# Patient Record
Sex: FEMALE | Race: WHITE
[De-identification: names, ages, dates, MRNs, and addresses within clinical notes are randomized per-mention and may not be internally consistent; named-entity substitution may affect disease eponyms.]

---

## 2017-02-02 ENCOUNTER — TRANSCRIPTION ENCOUNTER (OUTPATIENT)
Age: 57
End: 2017-02-02

## 2017-02-08 PROBLEM — Z00.00 ENCOUNTER FOR PREVENTIVE HEALTH EXAMINATION: Status: ACTIVE | Noted: 2017-02-08

## 2017-03-09 ENCOUNTER — APPOINTMENT (OUTPATIENT)
Dept: OTOLARYNGOLOGY | Facility: CLINIC | Age: 57
End: 2017-03-09

## 2017-04-06 ENCOUNTER — APPOINTMENT (OUTPATIENT)
Dept: OTOLARYNGOLOGY | Facility: CLINIC | Age: 57
End: 2017-04-06

## 2017-05-11 ENCOUNTER — APPOINTMENT (OUTPATIENT)
Dept: OTOLARYNGOLOGY | Facility: CLINIC | Age: 57
End: 2017-05-11

## 2017-08-04 ENCOUNTER — APPOINTMENT (OUTPATIENT)
Dept: OTOLARYNGOLOGY | Facility: AMBULATORY SURGERY CENTER | Age: 57
End: 2017-08-04

## 2018-03-12 ENCOUNTER — APPOINTMENT (OUTPATIENT)
Dept: SURGERY | Facility: CLINIC | Age: 58
End: 2018-03-12
Payer: COMMERCIAL

## 2018-03-12 VITALS
WEIGHT: 250 LBS | HEIGHT: 70 IN | SYSTOLIC BLOOD PRESSURE: 126 MMHG | TEMPERATURE: 98.4 F | DIASTOLIC BLOOD PRESSURE: 74 MMHG | HEART RATE: 103 BPM | BODY MASS INDEX: 35.79 KG/M2

## 2018-03-12 PROCEDURE — 99213 OFFICE O/P EST LOW 20 MIN: CPT

## 2018-09-10 ENCOUNTER — APPOINTMENT (OUTPATIENT)
Dept: SURGERY | Facility: CLINIC | Age: 58
End: 2018-09-10
Payer: COMMERCIAL

## 2018-09-10 VITALS
TEMPERATURE: 98 F | HEART RATE: 80 BPM | WEIGHT: 250 LBS | SYSTOLIC BLOOD PRESSURE: 130 MMHG | HEIGHT: 70 IN | BODY MASS INDEX: 35.79 KG/M2 | DIASTOLIC BLOOD PRESSURE: 80 MMHG

## 2018-09-10 PROCEDURE — 99213 OFFICE O/P EST LOW 20 MIN: CPT

## 2019-03-11 ENCOUNTER — APPOINTMENT (OUTPATIENT)
Dept: SURGERY | Facility: CLINIC | Age: 59
End: 2019-03-11

## 2019-03-25 ENCOUNTER — APPOINTMENT (OUTPATIENT)
Dept: SURGERY | Facility: CLINIC | Age: 59
End: 2019-03-25
Payer: COMMERCIAL

## 2019-03-25 VITALS
WEIGHT: 250 LBS | SYSTOLIC BLOOD PRESSURE: 136 MMHG | TEMPERATURE: 97.8 F | HEIGHT: 70 IN | DIASTOLIC BLOOD PRESSURE: 84 MMHG | HEART RATE: 98 BPM | BODY MASS INDEX: 35.79 KG/M2

## 2019-03-25 PROCEDURE — 99213 OFFICE O/P EST LOW 20 MIN: CPT

## 2019-03-26 NOTE — HISTORY OF PRESENT ILLNESS
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 She underwent a right thoracoabdominal incision. She had an incisional hernia that was repaired with mesh in July 2015. She comes today for followup visit with no abdominal symptoms. She developed a couple of meningiomas and she has numbness in her left hemiface. Last CT was in March 2019 with LAURA. She is otherwise asymptomatic.

## 2019-03-26 NOTE — PHYSICAL EXAM
[Normal] : supple, no neck mass and thyroid not enlarged [Normal] : oriented to person, place and time, with appropriate affect [de-identified] : well-healed scars, no hernias.

## 2019-03-26 NOTE — ASSESSMENT
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 She underwent a right thoracoabdominal incision. She had an incisional hernia that was repaired with mesh in July 2015. She comes today for followup visit with no abdominal symptoms. She developed a couple of meningiomas and she has numbness in her left hemiface. Last CT was in March 2019 with LAURA. She is otherwise asymptomatic.\par \par Assessment and Plan:\par \par s/p radical resection of right retroperineal liposarcoma in April 2014.\par No adjuvant therapy recommended.\par Currently LAURA. Last CT in March 2019.with no evidence of recurrence.\par \par Return as needed or she becomes symptomatic.\par \par All the questions were answered to their satisfaction and I encourage the patient to call my office at anytime if she had any questions. Plan of care fully discussed with the patient.\par

## 2020-06-11 ENCOUNTER — RESULT REVIEW (OUTPATIENT)
Age: 60
End: 2020-06-11

## 2020-12-01 ENCOUNTER — TRANSCRIPTION ENCOUNTER (OUTPATIENT)
Age: 60
End: 2020-12-01

## 2020-12-30 ENCOUNTER — TRANSCRIPTION ENCOUNTER (OUTPATIENT)
Age: 60
End: 2020-12-30

## 2021-06-02 ENCOUNTER — TRANSCRIPTION ENCOUNTER (OUTPATIENT)
Age: 61
End: 2021-06-02

## 2021-06-16 ENCOUNTER — APPOINTMENT (OUTPATIENT)
Dept: ORTHOPEDIC SURGERY | Facility: CLINIC | Age: 61
End: 2021-06-16
Payer: COMMERCIAL

## 2021-06-16 VITALS — TEMPERATURE: 97.8 F

## 2021-06-16 DIAGNOSIS — Z96.641 PRESENCE OF RIGHT ARTIFICIAL HIP JOINT: ICD-10-CM

## 2021-06-16 DIAGNOSIS — Z78.9 OTHER SPECIFIED HEALTH STATUS: ICD-10-CM

## 2021-06-16 PROCEDURE — 73522 X-RAY EXAM HIPS BI 3-4 VIEWS: CPT

## 2021-06-16 PROCEDURE — 99204 OFFICE O/P NEW MOD 45 MIN: CPT

## 2021-06-16 NOTE — PHYSICAL EXAM
[de-identified] : General appearance: well nourished and hydrated, pleasant, alert and oriented x 3, cooperative.\par Cardiovascular: no apparent abnormalities, no lower leg edema, no varicosities, pedal pulses are palpable.\par Neurologic: sensation is normal, no muscle weakness in upper or lower extremities\par Dermatologic no apparent skin lesions, moist, warm, no rash.\par Gait: nonantalgic.\par \par Left knee\par Inspection: no effusion or erythema.\par Wounds: none.\par Alignment: normal.\par Palpation: no specific tenderness on palpation.\par ROM: 0-120 degrees\par Ligamentous laxity: all ligaments appear stable\par Muscle Test: good quad strength.\par \par Right knee\par Inspection: no effusion or erythema.\par Wounds: none.\par Alignment: normal.\par Palpation: no specific tenderness on palpation.\par ROM: 0-120 degrees\par Ligamentous laxity: all ligaments appear stable\par Muscle Test: good quad strength.\par \par Left Hip\par Inspection: No effusion\par Wounds: Healed incision about the left hip, no drainage or erythema\par Alignment: Normal\par Stability: No instability\par Palpation: No specific tenderness on palpation\par ROM: Flexion to 80, ER 30, ABD 30 \par Muscle Test: 5/5 All motor groups\par Leg examination: Calf is soft and non-tender.\par \par Right Hip\par Inspection: No effusion\par Wounds: Healed incision about the left hip, no drainage or erythema\par Alignment: Normal\par Stability: No instability\par Palpation: No specific tenderness on palpation\par ROM:  Flexion to 80, ER 30, ABD 30 \par Muscle Test: 5/5 All motor groups\par Leg examination: Calf is soft and non-tender.\par \par \par  [de-identified] : Radiographs done today AP pelvis and lateral of both hips shows well aligned cementless total hip replacements , no evidence of loosening or wear. No fractures or dislocations.

## 2021-06-16 NOTE — ASSESSMENT
[FreeTextEntry1] : 60 year old female s/p bilateral total hips, right in 02/2013 and the left in 06/2013 presents to the office with complaints of low back pain. No groin pain. Ranging her hips recreates lower lumbar pain. Her radiographs of hips shows them to be aligned and well fixed. Pt has a Hx of low back pain and i believe that this is an exacerbation. We ordered radiographs of her lumbar spine to be done. After reviewing these we will most likely refer her to a physical therapist or rehab doctor for eval and Tx.

## 2021-06-16 NOTE — HISTORY OF PRESENT ILLNESS
[de-identified] : 60 year old female s/p left total hip replacement 6/4/13 and right total hip replacement 2/13/13 presents to the office today complaining of new onset bilateral hip pain x1 month. Patient reports pain that is achy and dull in nature. Patient denies any groin pain and states that the severe pain comes and goes. Patient reports pain laterally at the hip with some low back pain. Patient denies any swelling, buckling, locking, clicking, or loss of motion. He reports doing okay with negotiating stairs. Patient denies any issues with laying in bed and reports difficulty with prolonged activities. She is taking Advil with only some relief. Patient is here today to discuss for pain relief.

## 2021-07-01 ENCOUNTER — APPOINTMENT (OUTPATIENT)
Dept: SURGERY | Facility: CLINIC | Age: 61
End: 2021-07-01
Payer: COMMERCIAL

## 2021-07-01 VITALS
HEIGHT: 70 IN | WEIGHT: 284.5 LBS | TEMPERATURE: 97.1 F | DIASTOLIC BLOOD PRESSURE: 80 MMHG | HEART RATE: 102 BPM | SYSTOLIC BLOOD PRESSURE: 138 MMHG | BODY MASS INDEX: 40.73 KG/M2

## 2021-07-01 PROCEDURE — 99215 OFFICE O/P EST HI 40 MIN: CPT

## 2021-07-01 NOTE — CONSULT LETTER
[Dear  ___] : Dear  [unfilled], [Consult Letter:] : I had the pleasure of evaluating your patient, [unfilled]. [Please see my note below.] : Please see my note below. [Consult Closing:] : Thank you very much for allowing me to participate in the care of this patient.  If you have any questions, please do not hesitate to contact me. [Sincerely,] : Sincerely, [FreeTextEntry3] : Shantel Hodge MD [DrJacque  ___] : Dr. ORR

## 2021-07-01 NOTE — ASSESSMENT
[FreeTextEntry1] : CRISTIN WHITT  is a pleasant 60 year year old woman  who came in 07/01/2021 for T1b left leg medial aspect of her shin melanoma . She has Dr BIRD PASCUAL as Her PCP.\par \par she had 1cm pigmented skin lesion for about one year, was biopsied by Dr Caro (dermatologist) on 6/10/2021 and final path showed malignant melanoma, Breslow thickness 0.8mm, no ulceration, mitosis <1, no LVI, no microsatellitosis, no regression\par \par \par \par will need 1cm margins wide local excision and sentinel LN biopsy, discussed in details the procedure and need for lymphoscintgraphy and blue dye to localize her SLNB

## 2021-07-01 NOTE — HISTORY OF PRESENT ILLNESS
[de-identified] : CRISTIN WHITT  is a pleasant 60 year year old woman  who came in 07/01/2021 for T1b left leg medial aspect of her shin melanoma . 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 She underwent a right thoracoabdominal incision. She had an incisional hernia that was repaired with mesh in July 2015. She comes today for followup visit with no abdominal symptoms. She developed a couple of meningiomas and she has numbness in her left hemiface. Last CT was in March 2019 with LAURA. She is otherwise asymptomatic. \par \par \par \par

## 2021-07-01 NOTE — PHYSICAL EXAM
[Normal] : supple, no neck mass and thyroid not enlarged [Normal Neck Lymph Nodes] : normal neck lymph nodes  [Normal Supraclavicular Lymph Nodes] : normal supraclavicular lymph nodes [Normal Groin Lymph Nodes] : normal groin lymph nodes [Normal Axillary Lymph Nodes] : normal axillary lymph nodes [Normal] : oriented to person, place and time, with appropriate affect [de-identified] : left leg medial aspect of shin 1.5cm ulceration at bx site

## 2021-07-06 ENCOUNTER — NON-APPOINTMENT (OUTPATIENT)
Age: 61
End: 2021-07-06

## 2021-07-09 ENCOUNTER — NON-APPOINTMENT (OUTPATIENT)
Age: 61
End: 2021-07-09

## 2021-07-25 ENCOUNTER — LABORATORY RESULT (OUTPATIENT)
Age: 61
End: 2021-07-25

## 2021-07-25 ENCOUNTER — OUTPATIENT (OUTPATIENT)
Dept: OUTPATIENT SERVICES | Facility: HOSPITAL | Age: 61
LOS: 1 days | Discharge: HOME | End: 2021-07-25

## 2021-07-25 DIAGNOSIS — Z11.59 ENCOUNTER FOR SCREENING FOR OTHER VIRAL DISEASES: ICD-10-CM

## 2021-07-28 ENCOUNTER — OUTPATIENT (OUTPATIENT)
Dept: OUTPATIENT SERVICES | Facility: HOSPITAL | Age: 61
LOS: 1 days | Discharge: HOME | End: 2021-07-28
Payer: COMMERCIAL

## 2021-07-28 ENCOUNTER — RESULT REVIEW (OUTPATIENT)
Age: 61
End: 2021-07-28

## 2021-07-28 ENCOUNTER — APPOINTMENT (OUTPATIENT)
Dept: SURGERY | Facility: HOSPITAL | Age: 61
End: 2021-07-28

## 2021-07-28 VITALS
HEIGHT: 70 IN | HEART RATE: 86 BPM | SYSTOLIC BLOOD PRESSURE: 111 MMHG | TEMPERATURE: 98 F | DIASTOLIC BLOOD PRESSURE: 67 MMHG | RESPIRATION RATE: 16 BRPM | OXYGEN SATURATION: 98 % | WEIGHT: 279.99 LBS

## 2021-07-28 VITALS
DIASTOLIC BLOOD PRESSURE: 68 MMHG | OXYGEN SATURATION: 99 % | HEART RATE: 86 BPM | RESPIRATION RATE: 18 BRPM | SYSTOLIC BLOOD PRESSURE: 151 MMHG

## 2021-07-28 DIAGNOSIS — Z96.641 PRESENCE OF RIGHT ARTIFICIAL HIP JOINT: Chronic | ICD-10-CM

## 2021-07-28 DIAGNOSIS — Z96.642 PRESENCE OF LEFT ARTIFICIAL HIP JOINT: Chronic | ICD-10-CM

## 2021-07-28 DIAGNOSIS — Z98.890 OTHER SPECIFIED POSTPROCEDURAL STATES: Chronic | ICD-10-CM

## 2021-07-28 DIAGNOSIS — C48.0 MALIGNANT NEOPLASM OF RETROPERITONEUM: Chronic | ICD-10-CM

## 2021-07-28 PROCEDURE — 11606 EXC TR-EXT MAL+MARG >4 CM: CPT

## 2021-07-28 PROCEDURE — 88307 TISSUE EXAM BY PATHOLOGIST: CPT | Mod: 26

## 2021-07-28 PROCEDURE — 78195 LYMPH SYSTEM IMAGING: CPT | Mod: 26

## 2021-07-28 PROCEDURE — 15738 MUSCLE-SKIN GRAFT LEG: CPT

## 2021-07-28 RX ORDER — MORPHINE SULFATE 50 MG/1
2 CAPSULE, EXTENDED RELEASE ORAL
Refills: 0 | Status: DISCONTINUED | OUTPATIENT
Start: 2021-07-28 | End: 2021-07-28

## 2021-07-28 RX ORDER — OXYCODONE AND ACETAMINOPHEN 5; 325 MG/1; MG/1
1 TABLET ORAL EVERY 4 HOURS
Refills: 0 | Status: DISCONTINUED | OUTPATIENT
Start: 2021-07-28 | End: 2021-07-28

## 2021-07-28 RX ORDER — SODIUM CHLORIDE 9 MG/ML
1000 INJECTION, SOLUTION INTRAVENOUS
Refills: 0 | Status: DISCONTINUED | OUTPATIENT
Start: 2021-07-28 | End: 2021-08-11

## 2021-07-28 RX ORDER — OXYCODONE AND ACETAMINOPHEN 5; 325 MG/1; MG/1
1 TABLET ORAL
Qty: 4 | Refills: 0
Start: 2021-07-28

## 2021-07-28 NOTE — ASU DISCHARGE PLAN (ADULT/PEDIATRIC) - CARE PROVIDER_API CALL
Shantel Hodge (MD)  Complex General Surgical Oncology; Surgery  [address] [facility], [address] [place] [address]  Phone: [phone]  Fax: [phone]  Follow Up Time: 2 weeks

## 2021-07-28 NOTE — CHART NOTE - NSCHARTNOTEFT_GEN_A_CORE
PACU ANESTHESIA ADMISSION NOTE      Procedure: Wide excision of melanoma of lower extremity with sentinel lymph node biopsy      Post op diagnosis:  Melanoma        ____  Intubated  TV:______       Rate: ______      FiO2: ______    __x__  Patent Airway    __x__  Full return of protective reflexes    __x__  Full recovery from anesthesia / back to baseline     Vitals:   T:    97.6       R:     18             BP:    145/67              Sat:   95%                P: 90      Mental Status:  __x__ Awake   __x___ Alert   _____ Drowsy   _____ Sedated    Nausea/Vomiting:  __x__ NO  ______Yes,   See Post - Op Orders          Pain Scale (0-10):  __0___    Treatment: ____ None    ___x_ See Post - Op/PCA Orders    Post - Operative Fluids:   ____ Oral   __x__ See Post - Op Orders    Plan: Discharge:   __x__Home       _____Floor     _____Critical Care    _____  Other:_________________    Comments:  pt tolerated procedure well, pt transferred to PACU and report was given to PACU RN, vital signs are stable and pt shows no signs of distress. no anesthesia complications, discharge pt when criteria met.

## 2021-07-28 NOTE — BRIEF OPERATIVE NOTE - CONDITION POST OP
stable [FreeTextEntry3] : Keyla Garza M.D.\par Director of Urology\par [facility]\par [address] [place] [address], [address]\[place] [address]

## 2021-07-28 NOTE — ASU DISCHARGE PLAN (ADULT/PEDIATRIC) - ASU DC SPECIAL INSTRUCTIONSFT
SURGERY DISCHARGE INSTRUCTIONS    FOLLOW-UP - with Dr. Hodge in 2 weeks. Call the office to make an appointment or if you have any questions/concerns.    DIET - regular.     ACTIVITY- No strenuous activity. No driving while taking pain medication.    WOUNDCARE - You have clear outer plastic dressing with gauze- remove 3 days after surgery and leave little white bandage strips underneath it on for 7 days. May shower 24 hours after surgery but no submerging wound under water for 2 weeks (tub bathing). Pat area dry when wet, keep clean and dry. Do not apply powders or lotion to wound area.     PAIN MEDS - Take prescription pain meds as instructed but only if needed as they can be addicting. Take over the counter extra strength tylenol 500mg and/or ibprofen 400mg with food every 6 hours for pain instead of prescription pain meds if you do not need stronger pain control. Do not take tylenol in addition to your prescription pain med if your prescription pain med already has tylenol in it. No more than 4g of tylenol in 24hrs or 1g in 4 hrs. No mixing alcohol with prescription pain meds. No driving or operating machinery while taking prescription pain meds.    ANTIBIOTICS- Augmentin for 10 days, take as directed.

## 2021-07-28 NOTE — PRE-ANESTHESIA EVALUATION ADULT - NSANTHOSAYNRD_GEN_A_CORE
No. RAJESH screening performed.  STOP BANG Legend: 0-2 = LOW Risk; 3-4 = INTERMEDIATE Risk; 5-8 = HIGH Risk

## 2021-07-28 NOTE — BRIEF OPERATIVE NOTE - NSICDXBRIEFPROCEDURE_GEN_ALL_CORE_FT
PROCEDURES:  Wide excision of melanoma of lower extremity with sentinel lymph node biopsy 28-Jul-2021 12:53:39  Dakota Bull

## 2021-08-03 LAB — SURGICAL PATHOLOGY STUDY: SIGNIFICANT CHANGE UP

## 2021-08-04 DIAGNOSIS — C43.72 MALIGNANT MELANOMA OF LEFT LOWER LIMB, INCLUDING HIP: ICD-10-CM

## 2021-08-04 DIAGNOSIS — I10 ESSENTIAL (PRIMARY) HYPERTENSION: ICD-10-CM

## 2021-08-12 ENCOUNTER — APPOINTMENT (OUTPATIENT)
Dept: SURGERY | Facility: CLINIC | Age: 61
End: 2021-08-12
Payer: COMMERCIAL

## 2021-08-12 VITALS
HEART RATE: 103 BPM | HEIGHT: 70 IN | DIASTOLIC BLOOD PRESSURE: 90 MMHG | TEMPERATURE: 96.7 F | BODY MASS INDEX: 40.8 KG/M2 | SYSTOLIC BLOOD PRESSURE: 160 MMHG | WEIGHT: 285 LBS

## 2021-08-12 PROCEDURE — 99024 POSTOP FOLLOW-UP VISIT: CPT

## 2021-08-14 NOTE — ASSESSMENT
[FreeTextEntry1] : CRISTIN WHITT  is a pleasant 60 year year old woman  who came in 07/01/2021 for T1b left leg medial aspect of her shin melanoma . She has Dr BIRD PASCUAL as Her PCP.\par \par she had 1cm pigmented skin lesion for about one year, was biopsied by Dr Caro (dermatologist) on 6/10/2021 and final path showed malignant melanoma, Breslow thickness 0.8mm, no ulceration, mitosis <1, no LVI, no microsatellitosis, no regression\par \par \par \par 7/28/2021\par WLE of left leg no residual\par \par sentinel LN bx negative\par \par 8/12/2021 mild left LE edema, wounds healed with 1ry intention, no evidence of infection or seroma, continue follow up with dermatologist, return to office if left leg swelling persist, instructed to wear compressional stocking

## 2021-08-14 NOTE — HISTORY OF PRESENT ILLNESS
[de-identified] : CRISTIN WHITT  is a pleasant 60 year year old woman  who came in 07/01/2021 for T1b left leg medial aspect of her shin melanoma . 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 She underwent a right thoracoabdominal incision. She had an incisional hernia that was repaired with mesh in July 2015. She comes today for followup visit with no abdominal symptoms. She developed a couple of meningiomas and she has numbness in her left hemiface. Last CT was in March 2019 with LAURA. She is otherwise asymptomatic. \par \par \par \par

## 2021-08-14 NOTE — PHYSICAL EXAM
[Normal] : supple, no neck mass and thyroid not enlarged [Normal Neck Lymph Nodes] : normal neck lymph nodes  [Normal Supraclavicular Lymph Nodes] : normal supraclavicular lymph nodes [Normal Groin Lymph Nodes] : normal groin lymph nodes [Normal Axillary Lymph Nodes] : normal axillary lymph nodes [Normal] : oriented to person, place and time, with appropriate affect [de-identified] : both left leg and groin incision healed

## 2021-08-18 ENCOUNTER — TRANSCRIPTION ENCOUNTER (OUTPATIENT)
Age: 61
End: 2021-08-18

## 2021-08-23 ENCOUNTER — TRANSCRIPTION ENCOUNTER (OUTPATIENT)
Age: 61
End: 2021-08-23

## 2021-09-24 NOTE — ASU PATIENT PROFILE, ADULT - TEACHING/LEARNING LEARNING PREFERENCES
We are committed to providing you with the best care possible. In order to help us achieve these goals please remember to bring all medications, herbal products, and over the counter supplements with you to each visit. If your provider has ordered testing for you, please be sure to follow up with our office if you have not received results within 7 days after the testing took place. *If you receive a survey after visiting one of our offices, please take time to share your experience concerning your physician office visit. These surveys are confidential and no health information about you is shared. We are eager to improve for you and we are counting on your feedback to help make that happen.
computer/internet/group instruction/individual instruction/pictorial/skill demonstration/verbal instruction/video/written material

## 2021-10-08 ENCOUNTER — APPOINTMENT (OUTPATIENT)
Dept: SURGERY | Facility: CLINIC | Age: 61
End: 2021-10-08
Payer: COMMERCIAL

## 2021-10-08 VITALS
WEIGHT: 285 LBS | SYSTOLIC BLOOD PRESSURE: 130 MMHG | TEMPERATURE: 96.4 F | HEIGHT: 70 IN | OXYGEN SATURATION: 95 % | DIASTOLIC BLOOD PRESSURE: 81 MMHG | BODY MASS INDEX: 40.8 KG/M2 | HEART RATE: 94 BPM

## 2021-10-08 DIAGNOSIS — D32.0 BENIGN NEOPLASM OF CEREBRAL MENINGES: ICD-10-CM

## 2021-10-08 DIAGNOSIS — C48.0 MALIGNANT NEOPLASM OF RETROPERITONEUM: ICD-10-CM

## 2021-10-08 PROCEDURE — 99214 OFFICE O/P EST MOD 30 MIN: CPT

## 2021-10-08 NOTE — HISTORY OF PRESENT ILLNESS
[de-identified] : Ms. CRISTIN WHITT is a pleasant 60 year year old female who has been struggling with Her weight for many years.    Ms. CRISTIN WHITT has tried countless diet and exercise programs, but has been unable to lose sufficient weight and keep it off.  She is here today to discuss surgical options for weight loss.\par \par She has always struggled with her weight.  She was able to lose ~60 pounds with weight watchers but was unable to keep it off.  She had a large retroperitoneal sarcoma excited by Dr. Whitten in 2014.  She was followed by Oncology for 5 years without recurrence of the sarcoma. \par

## 2021-10-08 NOTE — PLAN
[FreeTextEntry1] : Ms. CRISTIN WHITT is a 60 year year old female with obesity who is interested in undergoing laparoscopic sleeve gastrectomy for weight loss.  We discussed in detail how Bariatric Surgery is a tool to help treat the serious disease of obesity, and that Her compliance with diet, exercise, and follow-up is crucial to Her overall safety and success.  She will require the following pre-operative evaluations and testing:\par \par Lab work\par Psychological evaluation\par Diet history\par Cardiology evaluation\par Pulmonology evaluation, including sleep study\par Gastroenterology evaluation for upper endoscopy\par PMD clearance\par Nutritional evaluation\par Attendance at preoperative support groups\par \par We had a discussion about potential post-operative complications, including but not limited to: bleeding, infection, leak, stricture, blood clots, GERD, problems with anesthesia.  The patient understands and wishes to proceed.\par \par We discussed that smoking of any kind will preclude the patient from being able to have bariatric surgery.\par \par Will order CT Abdomen/Pelvis to rule out any sarcoma recurrence and send her to Dr. Hodge for oncology clearance\par Will need Neurosurgery clearance for meningioma\par

## 2021-10-25 ENCOUNTER — NON-APPOINTMENT (OUTPATIENT)
Age: 61
End: 2021-10-25

## 2021-11-05 ENCOUNTER — APPOINTMENT (OUTPATIENT)
Dept: SURGERY | Facility: CLINIC | Age: 61
End: 2021-11-05
Payer: COMMERCIAL

## 2021-11-05 ENCOUNTER — NON-APPOINTMENT (OUTPATIENT)
Age: 61
End: 2021-11-05

## 2021-11-05 VITALS — HEIGHT: 70 IN

## 2021-11-05 PROCEDURE — ZZZZZ: CPT

## 2021-12-06 ENCOUNTER — APPOINTMENT (OUTPATIENT)
Dept: SURGERY | Facility: CLINIC | Age: 61
End: 2021-12-06
Payer: COMMERCIAL

## 2021-12-06 VITALS — HEIGHT: 70 IN | BODY MASS INDEX: 41.09 KG/M2 | WEIGHT: 287 LBS

## 2021-12-06 PROCEDURE — ZZZZZ: CPT

## 2022-01-03 ENCOUNTER — APPOINTMENT (OUTPATIENT)
Dept: SURGERY | Facility: CLINIC | Age: 62
End: 2022-01-03
Payer: COMMERCIAL

## 2022-01-03 VITALS — HEIGHT: 70 IN | BODY MASS INDEX: 40.94 KG/M2 | WEIGHT: 286 LBS

## 2022-01-03 PROCEDURE — ZZZZZ: CPT

## 2022-02-07 ENCOUNTER — APPOINTMENT (OUTPATIENT)
Dept: SURGERY | Facility: CLINIC | Age: 62
End: 2022-02-07
Payer: COMMERCIAL

## 2022-02-07 VITALS — HEIGHT: 70 IN | BODY MASS INDEX: 40.94 KG/M2 | WEIGHT: 286 LBS

## 2022-02-07 PROCEDURE — ZZZZZ: CPT

## 2022-02-17 ENCOUNTER — OUTPATIENT (OUTPATIENT)
Dept: OUTPATIENT SERVICES | Facility: HOSPITAL | Age: 62
LOS: 1 days | Discharge: HOME | End: 2022-02-17

## 2022-02-17 ENCOUNTER — APPOINTMENT (OUTPATIENT)
Dept: NEUROPSYCHOLOGY | Facility: CLINIC | Age: 62
End: 2022-02-17

## 2022-02-17 DIAGNOSIS — Z96.641 PRESENCE OF RIGHT ARTIFICIAL HIP JOINT: Chronic | ICD-10-CM

## 2022-02-17 DIAGNOSIS — Z98.890 OTHER SPECIFIED POSTPROCEDURAL STATES: Chronic | ICD-10-CM

## 2022-02-17 DIAGNOSIS — F50.9 EATING DISORDER, UNSPECIFIED: ICD-10-CM

## 2022-02-17 DIAGNOSIS — Z96.642 PRESENCE OF LEFT ARTIFICIAL HIP JOINT: Chronic | ICD-10-CM

## 2022-02-17 DIAGNOSIS — C48.0 MALIGNANT NEOPLASM OF RETROPERITONEUM: Chronic | ICD-10-CM

## 2022-02-18 DIAGNOSIS — F50.9 EATING DISORDER, UNSPECIFIED: ICD-10-CM

## 2022-04-01 ENCOUNTER — NON-APPOINTMENT (OUTPATIENT)
Age: 62
End: 2022-04-01

## 2022-04-04 ENCOUNTER — NON-APPOINTMENT (OUTPATIENT)
Age: 62
End: 2022-04-04

## 2022-04-20 ENCOUNTER — APPOINTMENT (OUTPATIENT)
Dept: SURGERY | Facility: CLINIC | Age: 62
End: 2022-04-20
Payer: COMMERCIAL

## 2022-04-20 ENCOUNTER — TRANSCRIPTION ENCOUNTER (OUTPATIENT)
Age: 62
End: 2022-04-20

## 2022-04-20 VITALS
WEIGHT: 288 LBS | DIASTOLIC BLOOD PRESSURE: 82 MMHG | OXYGEN SATURATION: 96 % | HEIGHT: 70 IN | SYSTOLIC BLOOD PRESSURE: 132 MMHG | HEART RATE: 106 BPM | TEMPERATURE: 97.3 F | BODY MASS INDEX: 41.23 KG/M2

## 2022-04-20 PROCEDURE — 99213 OFFICE O/P EST LOW 20 MIN: CPT

## 2022-04-20 NOTE — HISTORY OF PRESENT ILLNESS
[de-identified] : Mary Ellen has completed all of her preoperative evaluations as she prepares for the laparoscopic sleeve gastrectomy.  Her CT scan was reviewed and it showed no evidence of sarcoma recurrence.

## 2022-04-20 NOTE — PLAN
[FreeTextEntry1] : Dr. Hodge to see her before surgery.\par F/u with us for pre-op / consent visit\par

## 2022-04-26 ENCOUNTER — APPOINTMENT (OUTPATIENT)
Dept: SURGERY | Facility: CLINIC | Age: 62
End: 2022-04-26
Payer: COMMERCIAL

## 2022-04-26 VITALS
SYSTOLIC BLOOD PRESSURE: 144 MMHG | HEIGHT: 70 IN | WEIGHT: 286 LBS | HEART RATE: 107 BPM | BODY MASS INDEX: 40.94 KG/M2 | OXYGEN SATURATION: 97 % | DIASTOLIC BLOOD PRESSURE: 96 MMHG | TEMPERATURE: 96.5 F

## 2022-04-26 DIAGNOSIS — C43.72 MALIGNANT MELANOMA OF LEFT LOWER LIMB, INCLUDING HIP: ICD-10-CM

## 2022-04-26 PROCEDURE — 99214 OFFICE O/P EST MOD 30 MIN: CPT

## 2022-05-02 PROBLEM — C43.72 MALIGNANT MELANOMA OF LEFT LOWER EXTREMITY INCLUDING HIP: Status: ACTIVE | Noted: 2021-07-01

## 2022-05-02 NOTE — ASSESSMENT
[FreeTextEntry1] : CRISTIN WHITT  is a pleasant 60 year year old woman  who came in 07/01/2021 for T1b left leg medial aspect of her shin melanoma . She has Dr BIRD PASCUAL as Her PCP.\par \par she had 1cm pigmented skin lesion for about one year, was biopsied by Dr Caro (dermatologist) on 6/10/2021 and final path showed malignant melanoma, Breslow thickness 0.8mm, no ulceration, mitosis <1, no LVI, no microsatellitosis, no regression\par \par \par \par 7/28/2021\par WLE of left leg no residual\par \par sentinel LN bx negative\par \par 8/12/2021 mild left LE edema, wounds healed with 1ry intention, no evidence of infection or seroma, continue follow up with dermatologist, return to office if left leg swelling persist, instructed to wear compressional stocking\par \par 4/26/2022 patient came for clearance prior bariatric surgery, she also reported some left leg edema chronic since surgery, she has no evidence of recurrence, she may proceed with her bariatric surgery, she will be referred to lymphedema clinic

## 2022-05-02 NOTE — HISTORY OF PRESENT ILLNESS
[de-identified] : CRISTIN WHITT  is a pleasant 60 year year old woman  who came in 07/01/2021 for T1b left leg medial aspect of her shin melanoma . 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 She underwent a right thoracoabdominal incision. She had an incisional hernia that was repaired with mesh in July 2015. She comes today for followup visit with no abdominal symptoms. She developed a couple of meningiomas and she has numbness in her left hemiface. Last CT was in March 2019 with LAURA. She is otherwise asymptomatic. \par \par 4/26/2022 patient came for clearance prior bariatric surgery, she also reported some left leg edema chronic since surgery\par \par \par \par

## 2022-05-02 NOTE — CONSULT LETTER
[Dear  ___] : Dear  [unfilled], [Consult Letter:] : I had the pleasure of evaluating your patient, [unfilled]. [Please see my note below.] : Please see my note below. [Consult Closing:] : Thank you very much for allowing me to participate in the care of this patient.  If you have any questions, please do not hesitate to contact me. [Sincerely,] : Sincerely, [DrJacque  ___] : Dr. ORR [FreeTextEntry3] : Shantel Hodge MD [DrJacque ___] : Dr. ORR

## 2022-05-02 NOTE — PHYSICAL EXAM
[Normal] : supple, no neck mass and thyroid not enlarged [Normal Neck Lymph Nodes] : normal neck lymph nodes  [Normal Supraclavicular Lymph Nodes] : normal supraclavicular lymph nodes [Normal Groin Lymph Nodes] : normal groin lymph nodes [Normal Axillary Lymph Nodes] : normal axillary lymph nodes [Normal] : oriented to person, place and time, with appropriate affect [de-identified] : both left leg and groin incision healed

## 2022-05-23 ENCOUNTER — RESULT REVIEW (OUTPATIENT)
Age: 62
End: 2022-05-23

## 2022-05-23 ENCOUNTER — OUTPATIENT (OUTPATIENT)
Dept: OUTPATIENT SERVICES | Facility: HOSPITAL | Age: 62
LOS: 1 days | Discharge: HOME | End: 2022-05-23
Payer: MEDICARE

## 2022-05-23 VITALS
RESPIRATION RATE: 16 BRPM | HEART RATE: 80 BPM | TEMPERATURE: 97 F | WEIGHT: 286.6 LBS | DIASTOLIC BLOOD PRESSURE: 90 MMHG | OXYGEN SATURATION: 98 % | HEIGHT: 70 IN | SYSTOLIC BLOOD PRESSURE: 150 MMHG

## 2022-05-23 DIAGNOSIS — Z98.890 OTHER SPECIFIED POSTPROCEDURAL STATES: Chronic | ICD-10-CM

## 2022-05-23 DIAGNOSIS — Z01.818 ENCOUNTER FOR OTHER PREPROCEDURAL EXAMINATION: ICD-10-CM

## 2022-05-23 DIAGNOSIS — E66.01 MORBID (SEVERE) OBESITY DUE TO EXCESS CALORIES: ICD-10-CM

## 2022-05-23 DIAGNOSIS — Z96.641 PRESENCE OF RIGHT ARTIFICIAL HIP JOINT: Chronic | ICD-10-CM

## 2022-05-23 DIAGNOSIS — C48.0 MALIGNANT NEOPLASM OF RETROPERITONEUM: Chronic | ICD-10-CM

## 2022-05-23 DIAGNOSIS — Z86.011 PERSONAL HISTORY OF BENIGN NEOPLASM OF THE BRAIN: Chronic | ICD-10-CM

## 2022-05-23 DIAGNOSIS — Z96.642 PRESENCE OF LEFT ARTIFICIAL HIP JOINT: Chronic | ICD-10-CM

## 2022-05-23 LAB
A1C WITH ESTIMATED AVERAGE GLUCOSE RESULT: 5.9 % — HIGH (ref 4–5.6)
ALBUMIN SERPL ELPH-MCNC: 4.8 G/DL — SIGNIFICANT CHANGE UP (ref 3.5–5.2)
ALP SERPL-CCNC: 84 U/L — SIGNIFICANT CHANGE UP (ref 30–115)
ALT FLD-CCNC: 21 U/L — SIGNIFICANT CHANGE UP (ref 0–41)
ANION GAP SERPL CALC-SCNC: 13 MMOL/L — SIGNIFICANT CHANGE UP (ref 7–14)
APTT BLD: 30.6 SEC — SIGNIFICANT CHANGE UP (ref 27–39.2)
AST SERPL-CCNC: 17 U/L — SIGNIFICANT CHANGE UP (ref 0–41)
BASOPHILS # BLD AUTO: 0.02 K/UL — SIGNIFICANT CHANGE UP (ref 0–0.2)
BASOPHILS NFR BLD AUTO: 0.3 % — SIGNIFICANT CHANGE UP (ref 0–1)
BILIRUB SERPL-MCNC: 0.3 MG/DL — SIGNIFICANT CHANGE UP (ref 0.2–1.2)
BLD GP AB SCN SERPL QL: SIGNIFICANT CHANGE UP
BUN SERPL-MCNC: 18 MG/DL — SIGNIFICANT CHANGE UP (ref 10–20)
CALCIUM SERPL-MCNC: 9.8 MG/DL — SIGNIFICANT CHANGE UP (ref 8.5–10.1)
CHLORIDE SERPL-SCNC: 102 MMOL/L — SIGNIFICANT CHANGE UP (ref 98–110)
CO2 SERPL-SCNC: 24 MMOL/L — SIGNIFICANT CHANGE UP (ref 17–32)
CREAT SERPL-MCNC: 0.9 MG/DL — SIGNIFICANT CHANGE UP (ref 0.7–1.5)
EGFR: 72 ML/MIN/1.73M2 — SIGNIFICANT CHANGE UP
EOSINOPHIL # BLD AUTO: 0.06 K/UL — SIGNIFICANT CHANGE UP (ref 0–0.7)
EOSINOPHIL NFR BLD AUTO: 0.9 % — SIGNIFICANT CHANGE UP (ref 0–8)
ESTIMATED AVERAGE GLUCOSE: 123 MG/DL — HIGH (ref 68–114)
GLUCOSE SERPL-MCNC: 93 MG/DL — SIGNIFICANT CHANGE UP (ref 70–99)
HCT VFR BLD CALC: 44.8 % — SIGNIFICANT CHANGE UP (ref 37–47)
HGB BLD-MCNC: 14.6 G/DL — SIGNIFICANT CHANGE UP (ref 12–16)
IMM GRANULOCYTES NFR BLD AUTO: 0.3 % — SIGNIFICANT CHANGE UP (ref 0.1–0.3)
INR BLD: 0.99 RATIO — SIGNIFICANT CHANGE UP (ref 0.65–1.3)
LYMPHOCYTES # BLD AUTO: 2.25 K/UL — SIGNIFICANT CHANGE UP (ref 1.2–3.4)
LYMPHOCYTES # BLD AUTO: 35.6 % — SIGNIFICANT CHANGE UP (ref 20.5–51.1)
MCHC RBC-ENTMCNC: 29.7 PG — SIGNIFICANT CHANGE UP (ref 27–31)
MCHC RBC-ENTMCNC: 32.6 G/DL — SIGNIFICANT CHANGE UP (ref 32–37)
MCV RBC AUTO: 91.1 FL — SIGNIFICANT CHANGE UP (ref 81–99)
MONOCYTES # BLD AUTO: 0.57 K/UL — SIGNIFICANT CHANGE UP (ref 0.1–0.6)
MONOCYTES NFR BLD AUTO: 9 % — SIGNIFICANT CHANGE UP (ref 1.7–9.3)
NEUTROPHILS # BLD AUTO: 3.4 K/UL — SIGNIFICANT CHANGE UP (ref 1.4–6.5)
NEUTROPHILS NFR BLD AUTO: 53.9 % — SIGNIFICANT CHANGE UP (ref 42.2–75.2)
NRBC # BLD: 0 /100 WBCS — SIGNIFICANT CHANGE UP (ref 0–0)
PLATELET # BLD AUTO: 235 K/UL — SIGNIFICANT CHANGE UP (ref 130–400)
POTASSIUM SERPL-MCNC: 4.2 MMOL/L — SIGNIFICANT CHANGE UP (ref 3.5–5)
POTASSIUM SERPL-SCNC: 4.2 MMOL/L — SIGNIFICANT CHANGE UP (ref 3.5–5)
PROT SERPL-MCNC: 7.5 G/DL — SIGNIFICANT CHANGE UP (ref 6–8)
PROTHROM AB SERPL-ACNC: 11.4 SEC — SIGNIFICANT CHANGE UP (ref 9.95–12.87)
RBC # BLD: 4.92 M/UL — SIGNIFICANT CHANGE UP (ref 4.2–5.4)
RBC # FLD: 13.2 % — SIGNIFICANT CHANGE UP (ref 11.5–14.5)
SODIUM SERPL-SCNC: 139 MMOL/L — SIGNIFICANT CHANGE UP (ref 135–146)
WBC # BLD: 6.32 K/UL — SIGNIFICANT CHANGE UP (ref 4.8–10.8)
WBC # FLD AUTO: 6.32 K/UL — SIGNIFICANT CHANGE UP (ref 4.8–10.8)

## 2022-05-23 PROCEDURE — 71046 X-RAY EXAM CHEST 2 VIEWS: CPT | Mod: 26

## 2022-05-23 PROCEDURE — 93010 ELECTROCARDIOGRAM REPORT: CPT

## 2022-05-23 NOTE — H&P PST ADULT - HISTORY OF PRESENT ILLNESS
62 Y/O FEMALE PT TO PAST WITH HX MORBID OBESITY. PT C/O BEING OVERWEIGHT FOR YEARS AND NEEDS WEIGHT LOSS SX FOR HEALTH REASONS, DENIES ANY PAIN  PT NOW FOR SCHEDULED PROCEDURE ( LAP SLEEVE GASTRECTOMY) . PT DENIES ANY CP SOB PALP COUGH DYSURIA FEVER URI. PT ABLE TO MORA 1-2 FOS W/O SOB  pt denies any covid s/s, or tested positive in the past  pt advised self quarantine till day of procedure  Anesthesia Alert  NO--Difficult Airway  NO--History of neck surgery or radiation  NO--Limited ROM of neck  NO--History of Malignant hyperthermia  NO--Personal or family history of Pseudocholinesterase deficiency.  NO--Prior Anesthesia Complication  NO--Latex Allergy  NO--Loose teeth  NO--History of Rheumatoid Arthritis  NO--RAJESH  NO--Bleeding risk  NO--Other_____     60 Y/O FEMALE PT TO PAST WITH HX MORBID OBESITY. PT C/O BEING OVERWEIGHT FOR YEARS AND NEEDS WEIGHT LOSS SX FOR HEALTH REASONS, DENIES ANY PAIN  PT NOW FOR SCHEDULED PROCEDURE ( LAP SLEEVE GASTRECTOMY) . PT DENIES ANY CP SOB PALP COUGH DYSURIA FEVER URI. PT ABLE TO MORA 1 FOS W/O SOB  pt denies any covid s/s, or tested positive in the past  pt advised self quarantine till day of procedure  Anesthesia Alert  CLASS III - + RAJESH  + CPAP  NO--History of neck surgery or radiation  NO--Limited ROM of neck  NO--History of Malignant hyperthermia  NO--Personal or family history of Pseudocholinesterase deficiency.  NO--Prior Anesthesia Complication  NO--Latex Allergy  NO--Loose teeth  NO--History of Rheumatoid Arthritis  NO--RAJESH  NO--Bleeding risk  NO--Other_____

## 2022-05-23 NOTE — H&P PST ADULT - NSICDXPASTSURGICALHX_GEN_ALL_CORE_FT
PAST SURGICAL HISTORY:  H/O meningioma of the brain excsion x 2    History of incisional hernia repair     History of total hip replacement, left     History of total hip replacement, right     Retroperitoneal liposarcoma removal of tumor    S/P lumpectomy of breast benign     PAST SURGICAL HISTORY:  H/O melanoma excision     H/O meningioma of the brain excsion x 2    History of incisional hernia repair     History of total hip replacement, left     History of total hip replacement, right     Retroperitoneal liposarcoma removal of tumor    S/P lumpectomy of breast benign

## 2022-05-31 RX ORDER — PANTOPRAZOLE 40 MG/1
40 TABLET, DELAYED RELEASE ORAL
Qty: 30 | Refills: 2 | Status: COMPLETED | COMMUNITY
Start: 2022-05-31 | End: 2022-09-10

## 2022-06-01 ENCOUNTER — APPOINTMENT (OUTPATIENT)
Dept: SURGERY | Facility: CLINIC | Age: 62
End: 2022-06-01
Payer: COMMERCIAL

## 2022-06-01 VITALS
TEMPERATURE: 97.6 F | HEIGHT: 70 IN | DIASTOLIC BLOOD PRESSURE: 80 MMHG | BODY MASS INDEX: 40.92 KG/M2 | SYSTOLIC BLOOD PRESSURE: 152 MMHG | WEIGHT: 285.8 LBS | OXYGEN SATURATION: 96 % | HEART RATE: 86 BPM

## 2022-06-01 PROCEDURE — 99213 OFFICE O/P EST LOW 20 MIN: CPT

## 2022-06-01 NOTE — PLAN
[FreeTextEntry1] : Mary Ellen underwent extensive preoperative workup and is scheduled to have laparoscopic sleeve gastrectomy . She will benefit from surgery to improve her quality of life and for management of her comorbid conditions.\par At this preoperative visit, we discussed the risks and benefits of weight loss surgery. We specifically discussed the risks of anesthesia including cardiac and pulmonary complications, DVT/PE, pneumonia, leaks, infection, death, bleeding, ulcers, and need for additional operations have been reviewed. We discussed the importance of a consistent diet and exercise regimen in regards to weight loss and maintenance as well. The importance of lifelong mineral and vitamin supplementation was also reviewed with the patient. The patient was given ample opportunity to ask questions and all questions were answered.\par \par She has LLE edema, will order LE duplex before surgery.  \par

## 2022-06-01 NOTE — HISTORY OF PRESENT ILLNESS
[de-identified] : Mary Ellen has completed all of her preoperative evaluations as she prepares for laparoscopic sleeve gastrectomy.

## 2022-06-01 NOTE — ASSESSMENT
[FreeTextEntry1] : 60 y/o female with Class 3 obesity and HTN preparing for laparoscopic sleeve gastrectomy.

## 2022-06-02 PROBLEM — C44.91 BASAL CELL CARCINOMA OF SKIN, UNSPECIFIED: Chronic | Status: ACTIVE | Noted: 2022-05-23

## 2022-06-08 ENCOUNTER — APPOINTMENT (OUTPATIENT)
Dept: VASCULAR SURGERY | Facility: CLINIC | Age: 62
End: 2022-06-08

## 2022-06-10 ENCOUNTER — NON-APPOINTMENT (OUTPATIENT)
Age: 62
End: 2022-06-10

## 2022-06-10 NOTE — ASU PATIENT PROFILE, ADULT - NSICDXPASTSURGICALHX_GEN_ALL_CORE_FT
PAST SURGICAL HISTORY:  H/O melanoma excision     H/O meningioma of the brain excsion x 2    History of incisional hernia repair     History of total hip replacement, left     History of total hip replacement, right     Retroperitoneal liposarcoma removal of tumor    S/P lumpectomy of breast benign

## 2022-06-10 NOTE — ASU PATIENT PROFILE, ADULT - FALL HARM RISK - UNIVERSAL INTERVENTIONS
Bed in lowest position, wheels locked, appropriate side rails in place/Call bell, personal items and telephone in reach/Instruct patient to call for assistance before getting out of bed or chair/Non-slip footwear when patient is out of bed/[place] to call system/Physically safe environment - no spills, clutter or unnecessary equipment/Purposeful Proactive Rounding/Room/bathroom lighting operational, light cord in reach

## 2022-06-13 ENCOUNTER — RESULT REVIEW (OUTPATIENT)
Age: 62
End: 2022-06-13

## 2022-06-13 ENCOUNTER — INPATIENT (INPATIENT)
Facility: HOSPITAL | Age: 62
LOS: 0 days | Discharge: HOME | End: 2022-06-14
Attending: SURGERY | Admitting: SURGERY
Payer: MEDICARE

## 2022-06-13 ENCOUNTER — TRANSCRIPTION ENCOUNTER (OUTPATIENT)
Age: 62
End: 2022-06-13

## 2022-06-13 VITALS
DIASTOLIC BLOOD PRESSURE: 66 MMHG | HEART RATE: 80 BPM | WEIGHT: 286.6 LBS | SYSTOLIC BLOOD PRESSURE: 121 MMHG | RESPIRATION RATE: 16 BRPM | HEIGHT: 70 IN | TEMPERATURE: 98 F | OXYGEN SATURATION: 95 %

## 2022-06-13 DIAGNOSIS — Z98.890 OTHER SPECIFIED POSTPROCEDURAL STATES: Chronic | ICD-10-CM

## 2022-06-13 DIAGNOSIS — Z96.642 PRESENCE OF LEFT ARTIFICIAL HIP JOINT: Chronic | ICD-10-CM

## 2022-06-13 DIAGNOSIS — Z86.011 PERSONAL HISTORY OF BENIGN NEOPLASM OF THE BRAIN: Chronic | ICD-10-CM

## 2022-06-13 DIAGNOSIS — Z96.641 PRESENCE OF RIGHT ARTIFICIAL HIP JOINT: Chronic | ICD-10-CM

## 2022-06-13 DIAGNOSIS — C48.0 MALIGNANT NEOPLASM OF RETROPERITONEUM: Chronic | ICD-10-CM

## 2022-06-13 LAB
ABO RH CONFIRMATION: SIGNIFICANT CHANGE UP
ANION GAP SERPL CALC-SCNC: 13 MMOL/L — SIGNIFICANT CHANGE UP (ref 7–14)
BASOPHILS # BLD AUTO: 0.01 K/UL — SIGNIFICANT CHANGE UP (ref 0–0.2)
BASOPHILS NFR BLD AUTO: 0.1 % — SIGNIFICANT CHANGE UP (ref 0–1)
BUN SERPL-MCNC: 18 MG/DL — SIGNIFICANT CHANGE UP (ref 10–20)
CALCIUM SERPL-MCNC: 9.1 MG/DL — SIGNIFICANT CHANGE UP (ref 8.5–10.1)
CHLORIDE SERPL-SCNC: 103 MMOL/L — SIGNIFICANT CHANGE UP (ref 98–110)
CO2 SERPL-SCNC: 24 MMOL/L — SIGNIFICANT CHANGE UP (ref 17–32)
CREAT SERPL-MCNC: 0.8 MG/DL — SIGNIFICANT CHANGE UP (ref 0.7–1.5)
EGFR: 83 ML/MIN/1.73M2 — SIGNIFICANT CHANGE UP
EOSINOPHIL # BLD AUTO: 0 K/UL — SIGNIFICANT CHANGE UP (ref 0–0.7)
EOSINOPHIL NFR BLD AUTO: 0 % — SIGNIFICANT CHANGE UP (ref 0–8)
GLUCOSE BLDC GLUCOMTR-MCNC: 89 MG/DL — SIGNIFICANT CHANGE UP (ref 70–99)
GLUCOSE SERPL-MCNC: 138 MG/DL — HIGH (ref 70–99)
HCT VFR BLD CALC: 41 % — SIGNIFICANT CHANGE UP (ref 37–47)
HGB BLD-MCNC: 13.9 G/DL — SIGNIFICANT CHANGE UP (ref 12–16)
IMM GRANULOCYTES NFR BLD AUTO: 0.4 % — HIGH (ref 0.1–0.3)
LYMPHOCYTES # BLD AUTO: 0.64 K/UL — LOW (ref 1.2–3.4)
LYMPHOCYTES # BLD AUTO: 7.5 % — LOW (ref 20.5–51.1)
MAGNESIUM SERPL-MCNC: 1.9 MG/DL — SIGNIFICANT CHANGE UP (ref 1.8–2.4)
MCHC RBC-ENTMCNC: 30.3 PG — SIGNIFICANT CHANGE UP (ref 27–31)
MCHC RBC-ENTMCNC: 33.9 G/DL — SIGNIFICANT CHANGE UP (ref 32–37)
MCV RBC AUTO: 89.5 FL — SIGNIFICANT CHANGE UP (ref 81–99)
MONOCYTES # BLD AUTO: 0.55 K/UL — SIGNIFICANT CHANGE UP (ref 0.1–0.6)
MONOCYTES NFR BLD AUTO: 6.5 % — SIGNIFICANT CHANGE UP (ref 1.7–9.3)
NEUTROPHILS # BLD AUTO: 7.26 K/UL — HIGH (ref 1.4–6.5)
NEUTROPHILS NFR BLD AUTO: 85.5 % — HIGH (ref 42.2–75.2)
NRBC # BLD: 0 /100 WBCS — SIGNIFICANT CHANGE UP (ref 0–0)
PHOSPHATE SERPL-MCNC: 4.3 MG/DL — SIGNIFICANT CHANGE UP (ref 2.1–4.9)
PLATELET # BLD AUTO: 228 K/UL — SIGNIFICANT CHANGE UP (ref 130–400)
POTASSIUM SERPL-MCNC: 4.8 MMOL/L — SIGNIFICANT CHANGE UP (ref 3.5–5)
POTASSIUM SERPL-SCNC: 4.8 MMOL/L — SIGNIFICANT CHANGE UP (ref 3.5–5)
RBC # BLD: 4.58 M/UL — SIGNIFICANT CHANGE UP (ref 4.2–5.4)
RBC # FLD: 13.7 % — SIGNIFICANT CHANGE UP (ref 11.5–14.5)
SODIUM SERPL-SCNC: 140 MMOL/L — SIGNIFICANT CHANGE UP (ref 135–146)
WBC # BLD: 8.49 K/UL — SIGNIFICANT CHANGE UP (ref 4.8–10.8)
WBC # FLD AUTO: 8.49 K/UL — SIGNIFICANT CHANGE UP (ref 4.8–10.8)

## 2022-06-13 PROCEDURE — 88305 TISSUE EXAM BY PATHOLOGIST: CPT | Mod: 26

## 2022-06-13 PROCEDURE — 93010 ELECTROCARDIOGRAM REPORT: CPT

## 2022-06-13 PROCEDURE — 43775 LAP SLEEVE GASTRECTOMY: CPT

## 2022-06-13 PROCEDURE — 71045 X-RAY EXAM CHEST 1 VIEW: CPT | Mod: 26

## 2022-06-13 PROCEDURE — 99223 1ST HOSP IP/OBS HIGH 75: CPT | Mod: 24,25

## 2022-06-13 RX ORDER — ENOXAPARIN SODIUM 100 MG/ML
40 INJECTION SUBCUTANEOUS ONCE
Refills: 0 | Status: COMPLETED | OUTPATIENT
Start: 2022-06-13 | End: 2022-06-13

## 2022-06-13 RX ORDER — SODIUM CHLORIDE 9 MG/ML
1000 INJECTION, SOLUTION INTRAVENOUS
Refills: 0 | Status: DISCONTINUED | OUTPATIENT
Start: 2022-06-13 | End: 2022-06-13

## 2022-06-13 RX ORDER — HYDROMORPHONE HYDROCHLORIDE 2 MG/ML
0.5 INJECTION INTRAMUSCULAR; INTRAVENOUS; SUBCUTANEOUS EVERY 6 HOURS
Refills: 0 | Status: DISCONTINUED | OUTPATIENT
Start: 2022-06-13 | End: 2022-06-14

## 2022-06-13 RX ORDER — HYDROMORPHONE HYDROCHLORIDE 2 MG/ML
0.5 INJECTION INTRAMUSCULAR; INTRAVENOUS; SUBCUTANEOUS
Refills: 0 | Status: DISCONTINUED | OUTPATIENT
Start: 2022-06-13 | End: 2022-06-13

## 2022-06-13 RX ORDER — SODIUM CHLORIDE 9 MG/ML
1000 INJECTION, SOLUTION INTRAVENOUS
Refills: 0 | Status: DISCONTINUED | OUTPATIENT
Start: 2022-06-13 | End: 2022-06-14

## 2022-06-13 RX ORDER — KETOROLAC TROMETHAMINE 30 MG/ML
15 SYRINGE (ML) INJECTION EVERY 6 HOURS
Refills: 0 | Status: DISCONTINUED | OUTPATIENT
Start: 2022-06-13 | End: 2022-06-14

## 2022-06-13 RX ORDER — ALBUTEROL 90 UG/1
2 AEROSOL, METERED ORAL EVERY 6 HOURS
Refills: 0 | Status: DISCONTINUED | OUTPATIENT
Start: 2022-06-13 | End: 2022-06-14

## 2022-06-13 RX ORDER — INFLUENZA VIRUS VACCINE 15; 15; 15; 15 UG/.5ML; UG/.5ML; UG/.5ML; UG/.5ML
0.5 SUSPENSION INTRAMUSCULAR ONCE
Refills: 0 | Status: DISCONTINUED | OUTPATIENT
Start: 2022-06-13 | End: 2022-06-14

## 2022-06-13 RX ORDER — FAMOTIDINE 10 MG/ML
20 INJECTION INTRAVENOUS ONCE
Refills: 0 | Status: COMPLETED | OUTPATIENT
Start: 2022-06-13 | End: 2022-06-13

## 2022-06-13 RX ORDER — ACETAMINOPHEN 500 MG
1000 TABLET ORAL ONCE
Refills: 0 | Status: COMPLETED | OUTPATIENT
Start: 2022-06-13 | End: 2022-06-13

## 2022-06-13 RX ORDER — BUPIVACAINE 13.3 MG/ML
20 INJECTION, SUSPENSION, LIPOSOMAL INFILTRATION ONCE
Refills: 0 | Status: COMPLETED | OUTPATIENT
Start: 2022-06-13 | End: 2022-06-13

## 2022-06-13 RX ORDER — ACETAMINOPHEN 500 MG
1000 TABLET ORAL ONCE
Refills: 0 | Status: COMPLETED | OUTPATIENT
Start: 2022-06-13 | End: 2022-06-14

## 2022-06-13 RX ORDER — MAGNESIUM SULFATE 500 MG/ML
1 VIAL (ML) INJECTION ONCE
Refills: 0 | Status: COMPLETED | OUTPATIENT
Start: 2022-06-13 | End: 2022-06-13

## 2022-06-13 RX ORDER — ONDANSETRON 8 MG/1
4 TABLET, FILM COATED ORAL ONCE
Refills: 0 | Status: COMPLETED | OUTPATIENT
Start: 2022-06-13 | End: 2022-06-13

## 2022-06-13 RX ORDER — PANTOPRAZOLE SODIUM 20 MG/1
40 TABLET, DELAYED RELEASE ORAL EVERY 24 HOURS
Refills: 0 | Status: DISCONTINUED | OUTPATIENT
Start: 2022-06-13 | End: 2022-06-14

## 2022-06-13 RX ORDER — ENOXAPARIN SODIUM 100 MG/ML
40 INJECTION SUBCUTANEOUS EVERY 24 HOURS
Refills: 0 | Status: DISCONTINUED | OUTPATIENT
Start: 2022-06-14 | End: 2022-06-14

## 2022-06-13 RX ORDER — SCOPALAMINE 1 MG/3D
1 PATCH, EXTENDED RELEASE TRANSDERMAL ONCE
Refills: 0 | Status: COMPLETED | OUTPATIENT
Start: 2022-06-13 | End: 2022-06-13

## 2022-06-13 RX ADMIN — Medication 15 MILLIGRAM(S): at 17:56

## 2022-06-13 RX ADMIN — ONDANSETRON 4 MILLIGRAM(S): 8 TABLET, FILM COATED ORAL at 14:58

## 2022-06-13 RX ADMIN — ENOXAPARIN SODIUM 40 MILLIGRAM(S): 100 INJECTION SUBCUTANEOUS at 23:52

## 2022-06-13 RX ADMIN — PANTOPRAZOLE SODIUM 40 MILLIGRAM(S): 20 TABLET, DELAYED RELEASE ORAL at 14:59

## 2022-06-13 RX ADMIN — ENOXAPARIN SODIUM 40 MILLIGRAM(S): 100 INJECTION SUBCUTANEOUS at 06:30

## 2022-06-13 RX ADMIN — BUPIVACAINE 20 MILLILITER(S): 13.3 INJECTION, SUSPENSION, LIPOSOMAL INFILTRATION at 10:00

## 2022-06-13 RX ADMIN — Medication 400 MILLIGRAM(S): at 10:42

## 2022-06-13 RX ADMIN — Medication 100 GRAM(S): at 23:52

## 2022-06-13 RX ADMIN — FAMOTIDINE 20 MILLIGRAM(S): 10 INJECTION INTRAVENOUS at 07:21

## 2022-06-13 RX ADMIN — SCOPALAMINE 1 PATCH: 1 PATCH, EXTENDED RELEASE TRANSDERMAL at 06:30

## 2022-06-13 RX ADMIN — SCOPALAMINE 1 PATCH: 1 PATCH, EXTENDED RELEASE TRANSDERMAL at 11:43

## 2022-06-13 NOTE — ASU PREOP CHECKLIST - HEIGHT IN INCHES
10

## 2022-06-13 NOTE — PATIENT PROFILE ADULT - FALL HARM RISK - HARM RISK INTERVENTIONS
Assistance with ambulation/Assistance OOB with selected safe patient handling equipment/Communicate Risk of Fall with Harm to all staff/Discuss with provider need for PT consult/Monitor gait and stability/Provide patient with walking aids - walker, cane, crutches/Reinforce activity limits and safety measures with patient and family/Sit up slowly, dangle for a short time, stand at bedside before walking/Tailored Fall Risk Interventions/Use of alarms - bed, chair and/or voice tab/Visual Cue: Yellow wristband and red socks/Bed in lowest position, wheels locked, appropriate side rails in place/Call bell, personal items and telephone in reach/Instruct patient to call for assistance before getting out of bed or chair/Non-slip footwear when patient is out of bed/[place] to call system/Physically safe environment - no spills, clutter or unnecessary equipment/Purposeful Proactive Rounding/Room/bathroom lighting operational, light cord in reach

## 2022-06-13 NOTE — CHART NOTE - NSCHARTNOTEFT_GEN_A_CORE
PACU ANESTHESIA ADMISSION NOTE      Procedure:   Post op diagnosis:      ____  Intubated  TV:______       Rate: ______      FiO2: ______    __x__  Patent Airway    _x___  Full return of protective reflexes    __x__  Full recovery from anesthesia / back to baseline     Vitals:   T:   36.2        R:  11                BP: 129/74                 Sat:  96                 P: 91      Mental Status:  _x___ Awake   _x____ Alert   _____ Drowsy   _____ Sedated    Nausea/Vomiting:  __x__ NO  ______Yes,   See Post - Op Orders          Pain Scale (0-10):  __x___    Treatment: ____ None    ____ See Post - Op/PCA Orders    Post - Operative Fluids:   ____ Oral   ___x_ See Post - Op Orders    Plan: Discharge:   ____Home       _____Floor     __x___Critical Care    _____  Other:_________________    Comments: tolerated procedure well
Post Operative Note  Patient: CRISTIN WHITT 61y (1960) Female   MRN: [id number]  Location: [address]  Visit: 06-13-22 Inpatient  Date: 06-13-22 @ 19:45    Procedure: Obesity, morbid, BMI 40.0-49.9    S/P Laparoscopic sleeve gastrectomy    Laparoscopic repair of sliding hiatal hernia    Lysis of peritoneal adhesions    Subjective:   Nausea: no, Vomiting: no, Ambulating: yes, Flatus: no  Pain Assessment: Patient is complaining of mild diffuse abdominal pain that is appropriate for post-operative course.     Objective:  Vitals: T(F): 99.6 (06-13-22 @ 16:05), Max: 99.6 (06-13-22 @ 16:05)  HR: 94 (06-13-22 @ 16:05)  BP: 136/66 (06-13-22 @ 16:05) (110/62 - 137/63)  RR: 18 (06-13-22 @ 16:05)  SpO2: 96% (06-13-22 @ 16:05)  Vent Settings:     In:   06-13-22 @ 07:01  -  06-13-22 @ 19:45  --------------------------------------------------------  IN: 680 mL      IV Fluids: lactated ringers. 1000 milliLiter(s) (125 mL/Hr) IV Continuous <Continuous>      Out:   06-13-22 @ 07:01  -  06-13-22 @ 19:45  --------------------------------------------------------  OUT: 0 mL      EBL:     Voided Urine:   06-13-22 @ 07:01  -  06-13-22 @ 19:45  --------------------------------------------------------  OUT: 0 mL    Physical Examination:  General Appearance: NAD  HEENT: EOMI, sclera non-icteric.  Heart: RRR  Lungs: CTABL  Abdomen:  Soft, minimally tender, appropriate for post-op course, nondistended. No rigidity, guarding, or rebound tenderness.   MSK/Extremities: Warm & well-perfused. Peripheral pulses intact.  Skin: Warm, dry. No jaundice.   Incisions/Wounds: Dressings in place, clean, dry and intact, no signs of infection/active bleeding/drainage    Medications: [Standing]  acetaminophen   IVPB .. 1000 milliGRAM(s) IV Intermittent once  ALBUTerol    90 MICROgram(s) HFA Inhaler 2 Puff(s) Inhalation every 6 hours PRN  enalapril 10 milliGRAM(s) Oral daily  HYDROmorphone  Injectable 0.5 milliGRAM(s) IV Push every 6 hours PRN  influenza   Vaccine 0.5 milliLiter(s) IntraMuscular once  ketorolac   Injectable 15 milliGRAM(s) IV Push every 6 hours PRN  lactated ringers. 1000 milliLiter(s) IV Continuous <Continuous>  pantoprazole  Injectable 40 milliGRAM(s) IV Push every 24 hours    Medications: [PRN]  acetaminophen   IVPB .. 1000 milliGRAM(s) IV Intermittent once  ALBUTerol    90 MICROgram(s) HFA Inhaler 2 Puff(s) Inhalation every 6 hours PRN  enalapril 10 milliGRAM(s) Oral daily  HYDROmorphone  Injectable 0.5 milliGRAM(s) IV Push every 6 hours PRN  influenza   Vaccine 0.5 milliLiter(s) IntraMuscular once  ketorolac   Injectable 15 milliGRAM(s) IV Push every 6 hours PRN  lactated ringers. 1000 milliLiter(s) IV Continuous <Continuous>  pantoprazole  Injectable 40 milliGRAM(s) IV Push every 24 hours      DVT PROPHYLAXIS:   GI PROPHYLAXIS: pantoprazole  Injectable 40 milliGRAM(s) IV Push every 24 hours    Imaging:  No post-op imaging studies    Assessment:  61yF S/P Laparoscopic sleeve gastrectomy, repair of sliding hiatal hernia, Lysis of peritoneal adhesions    Plan:  - Management per SICU  - Monitor PO intake  - Encourage ambulation  - Monitor vitals  - Monitor post-op labs and replete as necessary  - Monitor for bowel function  - Monitor urine output  - Monitor wound and dressing for changes, redress as needed.    Date/Time: 06-13-22 @ 19:45

## 2022-06-13 NOTE — BRIEF OPERATIVE NOTE - NSICDXBRIEFPROCEDURE_GEN_ALL_CORE_FT
PROCEDURES:  Laparoscopic sleeve gastrectomy 13-Jun-2022 11:12:23  Wilmer Batres  Laparoscopic repair of sliding hiatal hernia 13-Jun-2022 11:13:18  Wilmer Batres  Lysis of peritoneal adhesions 13-Jun-2022 11:13:34  Wilmer Bartes

## 2022-06-13 NOTE — CONSULT NOTE ADULT - SUBJECTIVE AND OBJECTIVE BOX
SICU Consultation Note  =====================================================  HPI:  61y female BMI 41.1, PMH HTN, basal cell carcinoma, COPD, RAJESH on CPAP; PSH of retroperitoneal sarcoma excision w/ right adrenalectomy (2014 w/ Dr. Choi), breast lumpectomy, b/l hip arthroplasty, incisional hernia repair; s/p elective laparoscopic sleeve gastrectomy w/ repair of sliding hiatal hernia w/ lysis of adhesions, small colonic serosal tear repair, negative leak test. Pt remained hemodynamically stable intraop, no requirement for vasopressors or blood products, and was intubated/extubated without any issues, as per anesthesia.     Patient seen bedside in PACU, normotensive and hemodynamically stable. Pt c/o nausea, no vomiting.    Surgery Information  OR time:   3 hours           EBL:    10cc            UO:    No hays intraop            IV Fluids:    750cc         Blood Products:    None         PAST MEDICAL & SURGICAL HISTORY:  COPD (chronic obstructive pulmonary disease)      CA skin, basal cell      History of total hip replacement, right      History of total hip replacement, left      Retroperitoneal liposarcoma  removal of tumor      History of incisional hernia repair      H/O meningioma of the brain  excision x 2      S/P lumpectomy of breast  benign      H/O melanoma excision          Allergies  No Known Allergies        SH:  Home Medications:  enalapril 10 mg oral tablet: 1 tab(s) orally once a day (13 Jun 2022 06:17)    Advanced Directives: Full Code     ROS:  [X] A ten-point review of systems was otherwise negative except as noted.  [ ] Due to altered mental status/intubation, subjective information were not able to be obtained from the patient. History was obtained, to the extent possible, from review of the chart and collateral sources of information.      CURRENT MEDICATIONS:   --------------------------------------------------------------------------------------  Neurologic Medications  HYDROmorphone  Injectable 0.5 milliGRAM(s) IV Push every 6 hours PRN Severe Pain (7 - 10)  ketorolac   Injectable 15 milliGRAM(s) IV Push every 6 hours PRN Moderate Pain (4 - 6)    Respiratory Medications    Cardiovascular Medications  enalapril 10 milliGRAM(s) Oral daily    Gastrointestinal Medications  lactated ringers. 1000 milliLiter(s) IV Continuous <Continuous>  pantoprazole  Injectable 40 milliGRAM(s) IV Push every 24 hours    Genitourinary Medications    Hematologic/Oncologic Medications    Antimicrobial/Immunologic Medications    Endocrine/Metabolic Medications    Topical/Other Medications    --------------------------------------------------------------------------------------    Vital Signs Last 24 Hrs  T(C): 37.1 (13 Jun 2022 11:06), Max: 37.1 (13 Jun 2022 11:06)  T(F): 98.7 (13 Jun 2022 11:06), Max: 98.7 (13 Jun 2022 11:06)  HR: 76 (13 Jun 2022 12:00) (76 - 94)  BP: 118/62 (13 Jun 2022 12:00) (117/58 - 137/63)  BP(mean): --  RR: 14 (13 Jun 2022 12:00) (13 - 16)  SpO2: 92% (13 Jun 2022 12:00) (92% - 99%)  I&O's Detail    I&O's Summary      LABS:  - f/u 8PM labs                    EXAM:  General/Neuro  GCS: 15  Exam: Normal, NAD, alert, oriented x 3, no focal deficits. Moving all extremities.    Respiratory  Exam: Lungs clear to auscultation bilaterally, Normal expansion/effort.  SpO2 94% on 3L NC.    Cardiovascular  Exam: S1, S2.  Regular rate and rhythm.   Cardiac Rhythm: Normal Sinus Rhythm    GI  Exam: Abdomen soft, Non-tender, Non-distended.    Wound:  4 surgical trocar sites c/d/i. Midline abdominal incision c/d/i.  Current Diet:  Bariatric clear liquids    Extremities  Exam: Extremities warm, pink, and dry.    Derm:  Exam: Good skin turgor, no skin breakdown.        Tubes/Lines/Drains  ***  - PIV

## 2022-06-13 NOTE — CONSULT NOTE ADULT - ASSESSMENT
Assessment & Plan  61y Female BMI 41.1, s/p elective laparoscopic sleeve gastrectomy w/ repair of sliding hiatal hernia w/ lysis of adhesions, small colonic serosal tear repair, negative leak test.    NEURO:  - Pain: IV tylenol PRN, dilaudid PRN, toradol PRN    RESP:   #COPD  - goal SpO2 88-92%  - wean off to NC as tolerated  - albuterol HFA inhaler PRN for SOB/wheezing  - IS 10 times per hour  #RAJESH on CPAP  - home CPAP at bedside  Post-op CXR pending      CARDS:   #HTN  - c/w home enalapril 10mg qd  - keep normotensive  Post-op EKG: NSR, incomplete RBBB; QTc 467 (pre-op EKG demonstrated RSR' pattern in V1)    GI/NUTR:   #s/p elective laparoscopic sleeve gastrectomy w/ repair of sliding hiatal hernia w/ lysis of adhesions, small colonic serosal tear repair, negative leak test.  - Diet: bariatric clear liquids    /RENAL:   - Monitor UOP, f/u TOV  - f/u 8PM labs      HEME/ONC:       DVT prophylaxis- Lovenox 40mg q24h (to start 6/14), SCDs  - f/u 8pm labs    ID:  WBC- f/u 8pm labs  Temp trend- 24hrs T(F): 97.8 (06-13 @ 12:15), Max: 98.7 (06-13 @ 11:06)  Antibiotics- periop cefotetan 2g completed         ENDO:  - no chronic issues  - HA1C (5/23): 5.9    LINES/DRAINS:  PIV    Advanced Directives: Presumed Full Code    HCP/Emergency Contact-    DISPO:    SICU/SDU

## 2022-06-13 NOTE — CONSULT NOTE ADULT - ATTENDING COMMENTS
I examined the patient with the PA/resident and discussed my plan with the Resident/PA.  I personally provided over 30 minutes of direct critical care to this patient. I agree with the above resident/pa note unless directly contradicted below.     CRISTIN WHITT 61y Female BMI __s/p elective laparoscopic sleeve gastrectomy with b/l TAP block with no intraop complications.      PLAN:    NEUROLOGIC:   #Post op Pain   - controlled with IV Tylenol, Gabapentin, Toradol    RESPIRATORY:   #morbid obesity hypoventilation RAJESH requiring CPAP   - bedside set at 10 cm H20    CARDIOVASCULAR:   # high risk for hemodynamic deterioration due to morbid obesity s/p gastric surgery   - continue cardiac monitor x 24 hours     GASTROINTESTIONAL/NUTRITION:     #s/p gastric surgery   Diet, NPO  GI ppx: PPI  Bowel regimen once taking po meds    RENAL/GENITOURINARY:   No Thomas, pending post-op void, will bladder scan if necessary  Cr at baseline  F/u BMP , CBC     DVT ppx: HSQ    # Periop ABX  -- Ancef x2 doses      Tubes/Lines/Drains  ***  [x] Peripheral IV    ENDOCRINE:   #type 2 DM   - FS q4 while in ICU      DISPOSITION: Admit to SICU for hemodynamic monitoring s/p gastric surgery

## 2022-06-13 NOTE — BRIEF OPERATIVE NOTE - OPERATION/FINDINGS
Significant adhesive disease burden requiring over 2 hours of meticulous lysis of adhesions in the setting of previous operative management of retroperitoneal sarcoma  case almost aborted however, optimal visualization achieved. Small colonic serosal tear repair. Otherwise unremarkable sleeve gastrectomy, single stitch repair of small sliding hiatal hernia. Negative leak test.

## 2022-06-14 ENCOUNTER — TRANSCRIPTION ENCOUNTER (OUTPATIENT)
Age: 62
End: 2022-06-14

## 2022-06-14 VITALS
HEART RATE: 83 BPM | DIASTOLIC BLOOD PRESSURE: 70 MMHG | TEMPERATURE: 100 F | OXYGEN SATURATION: 95 % | SYSTOLIC BLOOD PRESSURE: 127 MMHG

## 2022-06-14 LAB
GLUCOSE BLDC GLUCOMTR-MCNC: 109 MG/DL — HIGH (ref 70–99)
GLUCOSE BLDC GLUCOMTR-MCNC: 116 MG/DL — HIGH (ref 70–99)

## 2022-06-14 PROCEDURE — 99232 SBSQ HOSP IP/OBS MODERATE 35: CPT | Mod: 24,25

## 2022-06-14 RX ORDER — ONDANSETRON 8 MG/1
4 TABLET, FILM COATED ORAL ONCE
Refills: 0 | Status: COMPLETED | OUTPATIENT
Start: 2022-06-14 | End: 2022-06-14

## 2022-06-14 RX ORDER — METOCLOPRAMIDE HCL 10 MG
10 TABLET ORAL ONCE
Refills: 0 | Status: COMPLETED | OUTPATIENT
Start: 2022-06-14 | End: 2022-06-14

## 2022-06-14 RX ADMIN — Medication 400 MILLIGRAM(S): at 00:20

## 2022-06-14 RX ADMIN — PANTOPRAZOLE SODIUM 40 MILLIGRAM(S): 20 TABLET, DELAYED RELEASE ORAL at 14:00

## 2022-06-14 RX ADMIN — Medication 10 MILLIGRAM(S): at 06:09

## 2022-06-14 RX ADMIN — Medication 10 MILLIGRAM(S): at 08:51

## 2022-06-14 RX ADMIN — ONDANSETRON 4 MILLIGRAM(S): 8 TABLET, FILM COATED ORAL at 04:40

## 2022-06-14 RX ADMIN — SODIUM CHLORIDE 125 MILLILITER(S): 9 INJECTION, SOLUTION INTRAVENOUS at 04:40

## 2022-06-14 NOTE — DISCHARGE NOTE PROVIDER - HOSPITAL COURSE
Patient is a 61F who presented to the hospital for elective laparoscopic sleeve gastrectomy on 6/13/2022 with Dr. Ortega. Post-operatively, patient was admitted to the SICU for monitoring. Post-op course was relatively uneventful besides some nausea on post-op day #1 that resolved. At time of discharge, patient was ambulating, tolerating diet, voiding, passing gas, and pain was well controlled.

## 2022-06-14 NOTE — DISCHARGE NOTE PROVIDER - CARE PROVIDER_API CALL
Carol Ortega (MD)  Surgery  [address] [place] [address], [address] [place] [address]  Phone: [phone]  Fax: [phone]  Scheduled Appointment: 06/22/2022

## 2022-06-14 NOTE — PROGRESS NOTE ADULT - ASSESSMENT
Assessment & Plan  61y Female BMI 41.1, s/p elective laparoscopic sleeve gastrectomy w/ repair of sliding hiatal hernia w/ lysis of adhesions, small colonic serosal tear repair, negative leak test.    NEURO:  - Pain: IV tylenol PRN, dilaudid PRN, toradol PRN --> controlled     RESP:   #COPD  - goal SpO2 88-92%  - on room air  - albuterol HFA inhaler PRN for SOB/wheezing  - IS 10 times per hour  #RAJESH on CPAP  - cpap bedside  #encourage ambulation    CARDS:   #HTN  - c/w home enalapril 10mg qd  - keep normotensive  Post-op EKG: NSR, incomplete RBBB; QTc 467 (pre-op EKG demonstrated RSR' pattern in V1)    GI/NUTR:   #s/p elective laparoscopic sleeve gastrectomy w/ repair of sliding hiatal hernia w/ lysis of adhesions, small colonic serosal tear repair, negative leak test.  - Diet: bariatric clear liquids --> tolerating  - Zofran for nausea/vomiting    /RENAL:   - Monitor UOP; voiding        HEME/ONC:    DVT prophylaxis- Lovenox 40mg q24h (to start 6/14), SCDs   Labs:          BUN/Cr- 18/0.8  -->          Electrolytes-Na 140 // K 4.8 // Mg 1.9 //  Phos 4.3  ID:  WBC- 8.5   Temp trend- 24hrs T(F): 97.8 (06-13 @ 12:15), Max: 98.7 (06-13 @ 11:06)  Antibiotics- periop cefotetan 2g completed    ENDO:  - no chronic issues  - HA1C (5/23): 5.9    LINES/DRAINS:  PIV    Advanced Directives: Presumed Full Code    HCP/Emergency Contact-    DISPO:    SICU/SDU    
Pt notified medication was sent to the pharmacy   
  A/P:   61y F POD1 lap sleeve gastrectomy, olive  -Continue tabitha clears  -PRN nausea meds, reglan added  -Ambulate, IS  -Pain control prn  -Dvt ppx, protonix  -Will re-assess nausea this afternoon for improvement    Terri Solo MD  MIS Fellow    [place] [id number]

## 2022-06-14 NOTE — DISCHARGE NOTE PROVIDER - NSDCFUADDINST_GEN_ALL_CORE_FT
You are being discharged from [facility]. Please call to schedule a follow up appointment with Dr. Ortega as previously discussed in 1week. Please take the medications prescribed for you pre-operatively in the office by the nurse practitioner including the lovenox injections. Do not take NSAIDs or aspirin for pain. You have also been prescribed zofran for nausea, please only take as needed. You may take the carafate as needed up to 4 times a day.  You may shower, but do not submerge in a water bath, and do not scrub at your incisions. Please avoid heavy weight lifting (nothing greater than 10 lbs), or strenuous activity for the next 4-6 weeks.  If you have any further questions about your care, please do not hesitate to contact Dr. Ortega's office or return to the Emergency Department.  You are being discharged from [facility]. Please call to schedule a follow up appointment with Dr. Ortega as previously discussed in 1week. Please take the medications prescribed for you pre-operatively in the office by the nurse practitioner. Do not take NSAIDs or aspirin for pain. You may shower, but do not submerge in a water bath, and do not scrub at your incisions. Please avoid heavy weight lifting (nothing greater than 10 lbs), or strenuous activity for the next 4-6 weeks.  If you have any further questions about your care, please do not hesitate to contact Dr. Ortega's office or return to the Emergency Department.

## 2022-06-14 NOTE — PROGRESS NOTE ADULT - ATTENDING COMMENTS
Pt. seen and examined earlier this am.  She is doing well.  Pain controlled, tolerating liquids but she feels nauseated.  Ambulating.     AVSS  A&OX3, NAD  Abd soft, NT, ND, incisions C/D/I    Labs reviewed    POD#1 s/p laparoscopic sleeve gastrectomy, HH repair and extensive GEE, doing well  Reglan for nausea  OOB ambulate  D/C home once nausea controlled  Pt has d/c instructions from the office and has a f/u appointment to see me next week.
Stable overnight. NO acute events. Tolerating diet. DC home today.

## 2022-06-14 NOTE — DISCHARGE NOTE NURSING/CASE MANAGEMENT/SOCIAL WORK - PATIENT PORTAL LINK FT
You can access the FollowMyHealth Patient Portal offered by [facility] by registering at the following website: [email or web address]. By joining [facility]’s FollowMyHealth portal, you will also be able to view your health information using other applications (apps) compatible with our system.

## 2022-06-14 NOTE — PROGRESS NOTE ADULT - SUBJECTIVE AND OBJECTIVE BOX
Surgery Progress Note       Patient: CRISTIN WHITT , 61y ([birth year])Female   MRN: [id number]  Location: [address]  Visit: 06-13-22 Inpatient  Date: 06-14-22 @ 09:07        Admitted :06-13-22 (1d)  LOS: 1d    Procedure/Dx/Injuries: POD1 lap sleeve, olive    Events of past 24 hours: c/o nausea, but ranjit liquids. no emesis. Walking, voiding, pain controlled      Vitals:   T(F): 99.2 (06-14-22 @ 04:00), Max: 99.6 (06-13-22 @ 16:05)  HR: 83 (06-14-22 @ 04:00)  BP: 114/55 (06-14-22 @ 04:00)  RR: 18 (06-14-22 @ 04:00)  SpO2: 92% (06-14-22 @ 04:00)      Diet, Clear Liquid:   Bariatric Clear Liquid (BARICLLIQ)      Fluids: lactated ringers.: Solution, 1000 milliLiter(s) infuse at 125 mL/Hr      I & O's:    06-13-22 @ 07:01  -  06-14-22 @ 07:00  --------------------------------------------------------  IN:    IV PiggyBack: 200 mL    Lactated Ringers: 2125 mL    Oral Fluid: 180 mL  Total IN: 2505 mL    OUT:    Voided (mL): 5 mL  Total OUT: 5 mL    Total NET: 2500 mL            PHYSICAL EXAM:  General Appearance: NAD  HEENT: Normocephalic  Heart: rrr  Lungs: No increased work of breathing or accessory muscle use.   Abdomen:  Soft, approp ttp  MSK/Extremities: Warm & well-perfused.   Skin: Warm, dry. No jaundice.   Incision/wound: healing well, clean, dry and intact    MEDICATIONS  (STANDING):  enalapril 10 milliGRAM(s) Oral daily  enoxaparin Injectable 40 milliGRAM(s) SubCutaneous every 24 hours  influenza   Vaccine 0.5 milliLiter(s) IntraMuscular once  lactated ringers. 1000 milliLiter(s) (125 mL/Hr) IV Continuous <Continuous>  pantoprazole  Injectable 40 milliGRAM(s) IV Push every 24 hours    MEDICATIONS  (PRN):  ALBUTerol    90 MICROgram(s) HFA Inhaler 2 Puff(s) Inhalation every 6 hours PRN Shortness of Breath and/or Wheezing  HYDROmorphone  Injectable 0.5 milliGRAM(s) IV Push every 6 hours PRN Severe Pain (7 - 10)  ketorolac   Injectable 15 milliGRAM(s) IV Push every 6 hours PRN Moderate Pain (4 - 6)      DVT PROPHYLAXIS: enoxaparin Injectable 40 milliGRAM(s) SubCutaneous every 24 hours    GI PROPHYLAXIS: pantoprazole  Injectable 40 milliGRAM(s) IV Push every 24 hours    ANTICOAGULATION:   ANTIBIOTICS:            LAB/STUDIES:  Labs:  CAPILLARY BLOOD GLUCOSE      POCT Blood Glucose.: 116 mg/dL (14 Jun 2022 07:55)                          13.9   8.49  )-----------( 228      ( 13 Jun 2022 21:30 )             41.0       Auto Neutrophil %: 85.5 % (06-13-22 @ 21:30)  Auto Immature Granulocyte %: 0.4 % (06-13-22 @ 21:30)    06-13    140  |  103  |  18  ----------------------------<  138<H>  4.8   |  24  |  0.8      Calcium, Total Serum: 9.1 mg/dL (06-13-22 @ 21:30)

## 2022-06-14 NOTE — PROGRESS NOTE ADULT - SUBJECTIVE AND OBJECTIVE BOX
CRISTIN WHITT  [id number]  61y Female    Indication for ICU admission: hemodynamic monitoring    Admit Date:06-13-22  ICU Date: 6/13/22  OR Date: 6/13/22    No Known Allergies    PAST MEDICAL & SURGICAL HISTORY:  COPD (chronic obstructive pulmonary disease)    CA skin, basal cell    History of total hip replacement, right    History of total hip replacement, left    Retroperitoneal liposarcoma  removal of tumor    History of incisional hernia repair    H/O meningioma of the brain  excsion x 2    S/P lumpectomy of breast  benign    H/O melanoma excision      Home Medications:  enalapril 10 mg oral tablet: 1 tab(s) orally once a day (13 Jun 2022 06:17)      24HRS EVENT:  6/13  Night  -voiding  -ambulating  -tolerating diet  -no nausea/vomiting  -pain controlled  -cpap overnight  -mag repleted    DVT PTX: Lovenox 40 q24h (to start 6/14)  GI PPX: IV protonix    ***Tubes/Lines/Drains  ***  - PIVs      REVIEW OF SYSTEMS    [X] A ten-point review of systems was otherwise negative except as noted.  [ ] Due to altered mental status/intubation, subjective information were not able to be obtained from the patient. History was obtained, to the extent possible, from review of the chart and collateral sources of information.           CRISTIN WHITT  [id number]  61y Female    Indication for ICU admission: hemodynamic monitoring    Admit Date:06-13-22  ICU Date: 6/13/22  OR Date: 6/13/22    No Known Allergies    PAST MEDICAL & SURGICAL HISTORY:  COPD (chronic obstructive pulmonary disease)    CA skin, basal cell    History of total hip replacement, right    History of total hip replacement, left    Retroperitoneal liposarcoma  removal of tumor    History of incisional hernia repair    H/O meningioma of the brain  excsion x 2    S/P lumpectomy of breast  benign    H/O melanoma excision      Home Medications:  enalapril 10 mg oral tablet: 1 tab(s) orally once a day (13 Jun 2022 06:17)      24HRS EVENT:  6/13  Night  -voiding  -ambulating  -tolerating diet  -pain controlled  -cpap overnight  -mag repleted  -zofran x1    DVT PTX: Lovenox 40 q24h (to start 6/14)  GI PPX: IV protonix    ***Tubes/Lines/Drains  ***  - PIVs      REVIEW OF SYSTEMS    [X] A ten-point review of systems was otherwise negative except as noted.  [ ] Due to altered mental status/intubation, subjective information were not able to be obtained from the patient. History was obtained, to the extent possible, from review of the chart and collateral sources of information.

## 2022-06-14 NOTE — DISCHARGE NOTE PROVIDER - NSDCFUSCHEDAPPT_GEN_ALL_CORE_FT
[facility] [address]  Scheduled Appointment: 06/22/2022

## 2022-06-14 NOTE — DISCHARGE NOTE PROVIDER - NSDCCPCAREPLAN_GEN_ALL_CORE_FT
PRINCIPAL DISCHARGE DIAGNOSIS  Diagnosis: Morbid obesity  Assessment and Plan of Treatment: elective laparoscopic sleeve gastrectomy on 6/13/2022 with Dr. Ortega

## 2022-06-15 ENCOUNTER — NON-APPOINTMENT (OUTPATIENT)
Age: 62
End: 2022-06-15

## 2022-06-16 LAB — SURGICAL PATHOLOGY STUDY: SIGNIFICANT CHANGE UP

## 2022-06-17 ENCOUNTER — NON-APPOINTMENT (OUTPATIENT)
Age: 62
End: 2022-06-17

## 2022-06-20 DIAGNOSIS — I10 ESSENTIAL (PRIMARY) HYPERTENSION: ICD-10-CM

## 2022-06-20 DIAGNOSIS — Z20.822 CONTACT WITH AND (SUSPECTED) EXPOSURE TO COVID-19: ICD-10-CM

## 2022-06-20 DIAGNOSIS — J44.9 CHRONIC OBSTRUCTIVE PULMONARY DISEASE, UNSPECIFIED: ICD-10-CM

## 2022-06-20 DIAGNOSIS — Z96.643 PRESENCE OF ARTIFICIAL HIP JOINT, BILATERAL: ICD-10-CM

## 2022-06-20 DIAGNOSIS — E66.01 MORBID (SEVERE) OBESITY DUE TO EXCESS CALORIES: ICD-10-CM

## 2022-06-20 DIAGNOSIS — E11.9 TYPE 2 DIABETES MELLITUS WITHOUT COMPLICATIONS: ICD-10-CM

## 2022-06-20 DIAGNOSIS — K66.0 PERITONEAL ADHESIONS (POSTPROCEDURAL) (POSTINFECTION): ICD-10-CM

## 2022-06-20 DIAGNOSIS — Z85.89 PERSONAL HISTORY OF MALIGNANT NEOPLASM OF OTHER ORGANS AND SYSTEMS: ICD-10-CM

## 2022-06-20 DIAGNOSIS — G47.33 OBSTRUCTIVE SLEEP APNEA (ADULT) (PEDIATRIC): ICD-10-CM

## 2022-06-20 DIAGNOSIS — K44.9 DIAPHRAGMATIC HERNIA WITHOUT OBSTRUCTION OR GANGRENE: ICD-10-CM

## 2022-06-22 ENCOUNTER — APPOINTMENT (OUTPATIENT)
Dept: SURGERY | Facility: CLINIC | Age: 62
End: 2022-06-22
Payer: COMMERCIAL

## 2022-06-22 VITALS
OXYGEN SATURATION: 97 % | TEMPERATURE: 97.1 F | SYSTOLIC BLOOD PRESSURE: 132 MMHG | BODY MASS INDEX: 38.88 KG/M2 | HEIGHT: 70 IN | DIASTOLIC BLOOD PRESSURE: 80 MMHG | HEART RATE: 94 BPM | WEIGHT: 271.6 LBS

## 2022-06-22 DIAGNOSIS — Z87.898 PERSONAL HISTORY OF OTHER SPECIFIED CONDITIONS: ICD-10-CM

## 2022-06-22 PROCEDURE — 99024 POSTOP FOLLOW-UP VISIT: CPT

## 2022-06-22 RX ORDER — GABAPENTIN 250 MG/5ML
250 SOLUTION ORAL EVERY 8 HOURS
Qty: 15 | Refills: 0 | Status: COMPLETED | COMMUNITY
Start: 2022-05-31 | End: 2022-06-22

## 2022-06-22 RX ORDER — ONDANSETRON 4 MG/1
4 TABLET ORAL
Qty: 9 | Refills: 0 | Status: COMPLETED | COMMUNITY
Start: 2022-06-14 | End: 2022-06-22

## 2022-06-22 NOTE — ADDENDUM
[FreeTextEntry1] : Pt. seen and examined.  She is doing very well.  Agree with above. F/u in 3 weeks.

## 2022-06-22 NOTE — HISTORY OF PRESENT ILLNESS
[Phase 2] : Phase 2 [Walking] : walking [de-identified] : Patient had surgery approximately 7 days. She did not take Zofran as she was not nauseous after discharge.\par No fever/chills/SOB. Not fatigued.\par Denies reflux/heartburn/nausea/vomiting. Bowel movements are normal.\par Taking 2 multivitamins with iron, 2 Viactiv chews and vitamin b 12  three days/week. She takes her Pantoprazole daily.\par Hypertension is unchanged. She is not using her CPAP machine and I discussed the risks of RAJESH and strongly recommended that she resume nightly use.\par

## 2022-06-22 NOTE — ASSESSMENT
[de-identified] : CRISTIN WHITT is a 61 year female seen today for postoperative bariatric follow up visit. \par Patient is compliant with dietary guidelines and advanced diet as recommended. She is measuring everything.\par Tolerating 2 protein shakes, Greek yogurt, scrambled eggs, cottage cheese, tuna fish and smooth peanut butter.\par Fluid intake is adequate with water, diet green teas and coffee with a protein shake.\par She is walking daily in her backyard. Reinforced no heavy lifting, bending and pulling for 6-8 weeks.\par \par Plan: Continue to advance diet as tolerated. RTO in 3 weeks. Call with concerns.

## 2022-06-22 NOTE — PHYSICAL EXAM
[de-identified] : Soft, nondistended [de-identified] : Incisions dry and clean with no evidence of erythema.

## 2022-07-13 ENCOUNTER — APPOINTMENT (OUTPATIENT)
Dept: SURGERY | Facility: CLINIC | Age: 62
End: 2022-07-13

## 2022-07-13 VITALS
TEMPERATURE: 97.3 F | WEIGHT: 261 LBS | OXYGEN SATURATION: 97 % | DIASTOLIC BLOOD PRESSURE: 76 MMHG | BODY MASS INDEX: 37.37 KG/M2 | SYSTOLIC BLOOD PRESSURE: 116 MMHG | HEIGHT: 70 IN | HEART RATE: 85 BPM

## 2022-07-13 DIAGNOSIS — Z87.898 PERSONAL HISTORY OF OTHER SPECIFIED CONDITIONS: ICD-10-CM

## 2022-07-13 DIAGNOSIS — Z98.890 PERSONAL HISTORY OF OTHER SPECIFIED CONDITIONS: ICD-10-CM

## 2022-07-13 PROCEDURE — 99024 POSTOP FOLLOW-UP VISIT: CPT

## 2022-07-13 NOTE — PHYSICAL EXAM
[Normal] : normoactive bowel sounds, soft and nontender, no hepatosplenomegaly or masses appreciated. Incisions healing appropriately without erythema or drainage [de-identified] : Soft, nondistended. Incisions. Incisions dry and clean

## 2022-07-13 NOTE — REASON FOR VISIT
[Post Operative Visit] : a post operative visit for [Other___] : [unfilled] [FreeTextEntry2] : Sleeve Gastrectomy on 6/13/2022

## 2022-07-13 NOTE — PLAN
[FreeTextEntry1] : Plan: Start Actigall 300 mg.\par          RTO in 2 months with blood work.\par          Call with concerns.

## 2022-07-13 NOTE — HISTORY OF PRESENT ILLNESS
[de-identified] : Patient had surgery approximately 1 month ago. \par No fever/chills/SOB. Not fatigued.\par Denies reflux/heartburn/nausea/vomiting. Bowel movements are normal.\par Taking 2 multivitamins with iron, 2 Viactiv chews, 1 vitamin b 12 and Pantoprazole daily.\par Using CPAP machine and hypertension is unchanged. \par

## 2022-07-13 NOTE — ASSESSMENT
[FreeTextEntry1] : CRISTIN WHITT is a 61 year female seen today for postoperative bariatric follow up visit. \par Patient is compliant with dietary guidelines and advanced diet as recommended.\par Tolerating 2 protein shakes, scrambled egg with cheese, Greek yogurt, cottage cheese, low fat deli turkey/ham/cheese, tuna fish, flounder and peanut butter.\par Fluid intake is adequate with water, diet green teas and Crystal Light\par She is walking for exercise daily. She plans to go swimming next week but will not add weight bearing exercises for another 2-4 weeks.

## 2022-07-29 ENCOUNTER — NON-APPOINTMENT (OUTPATIENT)
Age: 62
End: 2022-07-29

## 2022-09-15 ENCOUNTER — NON-APPOINTMENT (OUTPATIENT)
Age: 62
End: 2022-09-15

## 2022-09-19 RX ORDER — CALCIUM CARB/VITAMIN D3/VIT K1 650MG-12.5
TABLET,CHEWABLE ORAL DAILY
Refills: 0 | Status: ACTIVE | COMMUNITY

## 2022-09-19 RX ORDER — PEDI MULTIVIT NO.25/FOLIC ACID 300 MCG
TABLET,CHEWABLE ORAL DAILY
Refills: 0 | Status: ACTIVE | COMMUNITY

## 2022-09-20 ENCOUNTER — APPOINTMENT (OUTPATIENT)
Dept: SURGERY | Facility: CLINIC | Age: 62
End: 2022-09-20

## 2022-09-20 VITALS
WEIGHT: 238 LBS | HEIGHT: 70 IN | OXYGEN SATURATION: 96 % | SYSTOLIC BLOOD PRESSURE: 118 MMHG | TEMPERATURE: 97.5 F | DIASTOLIC BLOOD PRESSURE: 78 MMHG | BODY MASS INDEX: 34.07 KG/M2 | HEART RATE: 79 BPM

## 2022-09-20 PROCEDURE — 99213 OFFICE O/P EST LOW 20 MIN: CPT

## 2022-09-20 RX ORDER — URSODIOL 300 MG/1
300 CAPSULE ORAL
Qty: 60 | Refills: 5 | Status: DISCONTINUED | COMMUNITY
Start: 2022-07-11 | End: 2022-09-20

## 2022-09-20 NOTE — ASSESSMENT
[FreeTextEntry1] : CRISTIN WHITT is a 61 year female seen today for postoperative bariatric follow up visit. \par Patient is compliant with dietary guidelines and is planning her meals.\par She decreased her protein shake to 1 day, scrambled egg with cheese, toast with peanut butter or avocado, Lunch - tuna fish with cheese on 3 crackers, low fat deli turkey/ham/cheese on a lettuce wrap\par Dinner - chicken/flounder with softly cooked vegetables, cottage cheese with fruit and Greek yogurt with peanut butter.\par Fluid intake is adequate with water, diet green teas and Crystal Light\par She is walking for exercise daily and she joined the last weeks and plans to go 6 days/week. She added weight bearing exercises. \par

## 2022-09-20 NOTE — PHYSICAL EXAM
[Normal] : normoactive bowel sounds, soft and nontender, no hepatosplenomegaly or masses appreciated. Incisions healing appropriately without erythema or drainage [de-identified] : Soft, nondistended. Well healed incisions

## 2022-09-20 NOTE — HISTORY OF PRESENT ILLNESS
[de-identified] : Patient had surgery approximately 3 month ago. She feels great and is now able to do activities that she never thought was possible. \par Denies reflux/heartburn/nausea/vomiting. Bowel movements are normal.\par Taking 2 multivitamins with iron, 2 Viactiv chews daily and 1 vitamin b 12 every other day. She had poor tolerance to Ursodiol and was told to discontinue the medication.\par Using CPAP machine but want to stop using it. I discussed the risks of RAJESH and recommend that she follow up with pulmonary. Reinforce not stopping Enalapril with out the  advice of her cardiologist. Medications were reconciled.\par  \par

## 2022-09-20 NOTE — PLAN
[FreeTextEntry1] : Plan: Continue with behavioral changes.\par          RTO in 3 months with blood work\par          Call with questions and concerns.

## 2022-10-31 ENCOUNTER — RX RENEWAL (OUTPATIENT)
Age: 62
End: 2022-10-31

## 2022-12-13 ENCOUNTER — NON-APPOINTMENT (OUTPATIENT)
Age: 62
End: 2022-12-13

## 2023-01-10 NOTE — PHYSICAL EXAM
Blood pressure improved.   will continue current dosage and recheck blood pressure at upcoming visit with Dr. Escobar.  
[Normal] : affect appropriate

## 2023-01-17 ENCOUNTER — APPOINTMENT (OUTPATIENT)
Dept: SURGERY | Facility: CLINIC | Age: 63
End: 2023-01-17
Payer: COMMERCIAL

## 2023-01-17 VITALS
HEIGHT: 70 IN | WEIGHT: 229 LBS | TEMPERATURE: 95.4 F | HEART RATE: 100 BPM | SYSTOLIC BLOOD PRESSURE: 140 MMHG | OXYGEN SATURATION: 97 % | BODY MASS INDEX: 32.78 KG/M2 | DIASTOLIC BLOOD PRESSURE: 80 MMHG

## 2023-01-17 DIAGNOSIS — Z86.79 PERSONAL HISTORY OF OTHER DISEASES OF THE CIRCULATORY SYSTEM: ICD-10-CM

## 2023-01-17 PROCEDURE — 99213 OFFICE O/P EST LOW 20 MIN: CPT

## 2023-01-17 RX ORDER — ENALAPRIL MALEATE 10 MG/1
10 TABLET ORAL
Refills: 0 | Status: DISCONTINUED | COMMUNITY
End: 2023-01-17

## 2023-01-17 NOTE — PLAN
[FreeTextEntry1] : Plan: Continue with behavioral changes.\par          Get blood work from [facility].\par          RTO in 3 months.\par          Call with concerns.

## 2023-01-17 NOTE — ASSESSMENT
[FreeTextEntry1] : CRISTIN WHITT is a 62 year female seen today for bariatric follow up visit. \par Patient is compliant with dietary guidelines and is planning her meals.\par Breakfast - oatmeal or eggs, waffle with peanut butter.\par Lunch - tuna fish with 4 small wheat crackers, 1/2 grilled cheese sandwich with a small cup of tomato soup.\par Dinner - Turkey meatball/chicken/fish with vegetables \par Fluid intake is adequate with water, diet green teas and Crystal Light\par She goes to the gym 4 days/week for 45 minutes. \par \par \par

## 2023-01-17 NOTE — HISTORY OF PRESENT ILLNESS
[de-identified] : Patient had surgery approximately 6 month ago. She feels great. \par Denies reflux/heartburn/nausea/vomiting. Bowel movements are normal.\par Taking 2 multivitamins with iron, 2 Viactiv chews daily and 1 vitamin b 12 every other day.\par Patient reports that she has not been using her CPAP machine but followed up with pulmonary who made the recommendation not to use it. He plans to repeat her sleep study 1 year after surgery. B/P 118/80 and HR 87 - she is off her antihypertensive medication after 20 years.

## 2023-03-21 ENCOUNTER — NON-APPOINTMENT (OUTPATIENT)
Age: 63
End: 2023-03-21

## 2023-04-14 DIAGNOSIS — E66.9 OBESITY, UNSPECIFIED: ICD-10-CM

## 2023-04-17 ENCOUNTER — APPOINTMENT (OUTPATIENT)
Dept: SURGERY | Facility: CLINIC | Age: 63
End: 2023-04-17
Payer: COMMERCIAL

## 2023-04-17 VITALS
HEART RATE: 86 BPM | DIASTOLIC BLOOD PRESSURE: 78 MMHG | BODY MASS INDEX: 32.5 KG/M2 | OXYGEN SATURATION: 97 % | HEIGHT: 70 IN | TEMPERATURE: 97 F | WEIGHT: 227 LBS | SYSTOLIC BLOOD PRESSURE: 124 MMHG

## 2023-04-17 PROCEDURE — 99213 OFFICE O/P EST LOW 20 MIN: CPT

## 2023-04-17 NOTE — PLAN
[FreeTextEntry1] : Plan: Continue with behavioral changes.\par          RTO in 2 months with blood work.\par          Call with concerns.

## 2023-04-17 NOTE — ASSESSMENT
[FreeTextEntry1] : CRISTIN WHITT is a 62 year female seen today for bariatric follow up visit. She is doing well with her weight loss goals.\par Patient is compliant with dietary guidelines and is planning her meals. \par Breakfast -oatmeal or eggs, waffle with peanut butter and she will drink a protein shake after her workouts.\par Lunch - tuna fish with 4 small wheat crackers, 1/2 grilled cheese sandwich with a small cup of tomato soup.\par Dinner - Turkey meatball/chicken/fish with vegetables \par Fluid intake is adequate with water, diet green teas and Crystal Light\par She goes to the gym 4 days/week for 45 minutes and she recently started yoga. \par

## 2023-04-17 NOTE — HISTORY OF PRESENT ILLNESS
[de-identified] : Patient had surgery approximately 10 months ago. She feels great and expressed that she cannot believe how energetic she feels. She has derived many benefits from her weight loss - resolution of HTN, able to exercise almost daily without pain and can fit into almost anything that she likes. \par Denies reflux/heartburn/nausea/vomiting. Bowel movements are normal.\par Taking 2 multivitamins with iron, 2 Viactiv chews daily and 1 vitamin b 12 every other day.\par Patient reports that she has not been using her CPAP machine but will follow up again with pulmonary as she walks up feeling refreshed and sleeps throughout the night. Hypertension resolved. \par  \par

## 2023-06-19 ENCOUNTER — APPOINTMENT (OUTPATIENT)
Dept: SURGERY | Facility: CLINIC | Age: 63
End: 2023-06-19
Payer: COMMERCIAL

## 2023-06-19 VITALS
DIASTOLIC BLOOD PRESSURE: 78 MMHG | BODY MASS INDEX: 32.5 KG/M2 | TEMPERATURE: 97 F | SYSTOLIC BLOOD PRESSURE: 118 MMHG | WEIGHT: 227 LBS | HEIGHT: 70 IN | HEART RATE: 78 BPM | OXYGEN SATURATION: 95 %

## 2023-06-19 DIAGNOSIS — E66.9 OBESITY, UNSPECIFIED: ICD-10-CM

## 2023-06-19 PROCEDURE — 99213 OFFICE O/P EST LOW 20 MIN: CPT

## 2023-06-19 RX ORDER — ADHESIVE TAPE 3"X 2.3 YD
50 MCG TAPE, NON-MEDICATED TOPICAL DAILY
Refills: 0 | Status: ACTIVE | COMMUNITY

## 2023-06-19 NOTE — DATA REVIEWED
[FreeTextEntry1] : Blood work reviewed with patient. Lipid panel improved and all other values are essentially wnl.

## 2023-06-19 NOTE — ASSESSMENT
[FreeTextEntry1] : CRISTIN WHITT is a 62 year female seen today for bariatric follow up visit. She is doing well with her weight loss goals.\par Patient is compliant with dietary guidelines and is planning her meals. \par Breakfast  - oatmeal or eggs, waffle with peanut butter and she will drink a protein shake after her workouts.\par Lunch - tuna fish with 4 small wheat crackers, 1/2 grilled cheese sandwich or cottage cheese.\par Dinner - Turkey meatball/chicken/fish/hamburger with vegetables \par Fluid intake is adequate with water, diet green teas and Crystal Light\par She goes to the gym 4 days/week for 45 minutes and she recently started yoga. She reported how much she is enjoying her new life.\par  \par \par

## 2023-06-19 NOTE — PLAN
[FreeTextEntry1] : Plan: Continue with behavioral modification.\par          RTO in 6 months or sooner.\par          Call with concerns.

## 2023-06-19 NOTE — HISTORY OF PRESENT ILLNESS
[de-identified] : Patient had surgery approximately 1 year ago. She feels great.\par Denies reflux/heartburn/nausea/vomiting. Bowel movements are normal.\par Taking 2 multivitamins with iron, 2 Viactiv chews daily and 1 vitamin b 12 every other day.\par Patient reports that she has not been using her CPAP machine but will follow up again with pulmonary as she walks up feeling refreshed and sleeps throughout the night. Hypertension resolved. \par  \par

## 2023-06-29 NOTE — ASU PATIENT PROFILE, ADULT - NS PRO TALK SOMEONE YN

## 2023-10-23 NOTE — REASON FOR VISIT
[Follow-Up Visit] : a follow-up visit for [Other___] : [unfilled] [FreeTextEntry2] : Sleeve Gastrectomy on 6/13/2022 Eucrisa Counseling: Patient may experience a mild burning sensation during topical application. Eucrisa is not approved in children less than 3 months of age.

## 2023-12-11 ENCOUNTER — APPOINTMENT (OUTPATIENT)
Dept: SURGERY | Facility: CLINIC | Age: 63
End: 2023-12-11
Payer: COMMERCIAL

## 2023-12-11 VITALS
DIASTOLIC BLOOD PRESSURE: 86 MMHG | HEIGHT: 70 IN | WEIGHT: 231 LBS | SYSTOLIC BLOOD PRESSURE: 124 MMHG | TEMPERATURE: 97.1 F | BODY MASS INDEX: 33.07 KG/M2 | HEART RATE: 78 BPM | OXYGEN SATURATION: 96 %

## 2023-12-11 DIAGNOSIS — Z96.642 PRESENCE OF LEFT ARTIFICIAL HIP JOINT: ICD-10-CM

## 2023-12-11 DIAGNOSIS — Z98.84 BARIATRIC SURGERY STATUS: ICD-10-CM

## 2023-12-11 PROCEDURE — 99213 OFFICE O/P EST LOW 20 MIN: CPT

## 2024-01-18 ENCOUNTER — NON-APPOINTMENT (OUTPATIENT)
Age: 64
End: 2024-01-18

## 2024-05-14 ENCOUNTER — APPOINTMENT (OUTPATIENT)
Dept: PLASTIC SURGERY | Facility: CLINIC | Age: 64
End: 2024-05-14
Payer: COMMERCIAL

## 2024-05-14 DIAGNOSIS — D03.39 MELANOMA IN SITU OF OTHER PARTS OF FACE: ICD-10-CM

## 2024-05-14 PROCEDURE — 99204 OFFICE O/P NEW MOD 45 MIN: CPT

## 2024-05-14 RX ORDER — FLUTICASONE PROPIONATE 50 UG/1
SPRAY, METERED NASAL
Refills: 0 | Status: ACTIVE | COMMUNITY

## 2024-05-14 NOTE — HISTORY OF PRESENT ILLNESS
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 Hui and presents today to discuss reconstructive options. Pt s/p WLE of left lower leg melanoma with neg LNB, by . Pt states she healed well without any issues. Pt denies any h/o fever/chills, fatigue or unintentional wt loss.   Denies h/o DVT/PE or MRSA infection No h/o DM Non-smoker Occupation: works from home

## 2024-05-14 NOTE — ASSESSMENT
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   - Path reviewed with pt - Avoid nsaids / anticoagulation 1 week prior to surgery - Continue follow ups with all other provdiers per their recs (just had ENT f/u for tinnitus, R ear, this morning)  - Will schedule for outpatient procedure in [facility], pt going on a cruise June 29th to July 4th   Regarding the procedure proposed to excise the melanoma, we discussed the following risks:  Patient is aware of possible upstaging pending the final pathology report, as well as the need for additional surgery and/or other therapeutic interventions.  We discussed scarring, risk of repeat procedure, poor wound healing, bleeding, infection, and dissatisfaction with the outcome.  We also discussed the possible role of adjuvant therapy and other specialists being involved in the care of the patient.  Dermatologic surveillance was emphasized. All questions were answered to the patient's satisfaction.  Regarding the reconstruction after skin cancer  surgery, we discussed scarring, risk of repeat procedure, poor wound healing, need for additional revisionary surgery,asymmetry, dissatisfaction with the outcome, and unplanned surgery in the future.  All questions were answered.  All risks were well understood by the patient.  Pt elects for surgery after upcoming cruise--OR in July 2024  All ?s answered

## 2024-05-14 NOTE — PHYSICAL EXAM
[de-identified] : Well developed, well nourished in NAD  [de-identified] : Atraumatic, normocephalic  [de-identified] : TARAs [de-identified] : Normal ears, normal nose and normal lips  [de-identified] : No palpable lymphadenopathy [de-identified] : Non labored on RA [de-identified] : Right preauricular with 8mm red healing biopsy site, no surrounding skin changes or signs of infection

## 2024-06-10 ENCOUNTER — APPOINTMENT (OUTPATIENT)
Dept: SURGERY | Facility: CLINIC | Age: 64
End: 2024-06-10
Payer: COMMERCIAL

## 2024-06-10 VITALS
BODY MASS INDEX: 33.72 KG/M2 | DIASTOLIC BLOOD PRESSURE: 82 MMHG | HEART RATE: 72 BPM | SYSTOLIC BLOOD PRESSURE: 138 MMHG | WEIGHT: 235 LBS | OXYGEN SATURATION: 97 % | TEMPERATURE: 97 F

## 2024-06-10 DIAGNOSIS — G47.33 OBSTRUCTIVE SLEEP APNEA (ADULT) (PEDIATRIC): ICD-10-CM

## 2024-06-10 PROBLEM — J44.9 CHRONIC OBSTRUCTIVE PULMONARY DISEASE, UNSPECIFIED: Chronic | Status: ACTIVE | Noted: 2022-05-23

## 2024-06-10 PROCEDURE — 99213 OFFICE O/P EST LOW 20 MIN: CPT

## 2024-06-10 NOTE — HISTORY OF PRESENT ILLNESS
[de-identified] : Patient had surgery approximately 2 years ago. She is scheduled for excision of melanoma from right cheek on 7/15. She reported that she has been socializing more and expressed concerns about weight regain. Denies reflux/heartburn/nausea/vomiting. Bowel movements are normal. Taking 1 multivitamins with iron, 2 Viactiv chews daily,1 vitamin b 12 every other day, vitamin d 2000 UT, Hair Skin and Nail and Fish Oil daily and Turmeric. Patient reported that she saw her pulmonologist and is now compliant with her CPAP machine. Hypertension resolved.

## 2024-06-10 NOTE — ASSESSMENT
[FreeTextEntry1] : CRISTIN WHITT is a 63 year female seen today for bariatric follow up visit. Patient reported that she has not been as mindful about following the dietary guidelines. Breakfast - oatmeal or eggs, waffle with peanut butter and she will drink a protein shake after her workouts. Lunch - tuna fish with 4 small wheat crackers, 1/2 grilled cheese sandwich or cottage cheese. Dinner - Turkey meatball/chicken/fish/hamburger with vegetables She has not been  her liquids from her meals and I explained to patient that could be reason why she eats more as she pushes the food out of her gastric pouch. She was also encouraged to decrease her wine consumption.  Fluid intake is adequate with water, diet green teas and Crystal Light. She goes to the gym 3 days/week for 45 minutes and she recently started muscle strengthening exercises.

## 2024-06-10 NOTE — PLAN
[FreeTextEntry1] : Plan: Focus on portions and mindless eating.          Separate liquids from meals by 30-45 minutes.          Decrease wine consumption.          Blood work.          RTO in 6 months.          Call with concerns.

## 2024-06-26 ENCOUNTER — LABORATORY RESULT (OUTPATIENT)
Age: 64
End: 2024-06-26

## 2024-06-27 ENCOUNTER — INPATIENT (INPATIENT)
Facility: HOSPITAL | Age: 64
LOS: 0 days | Discharge: HOME | DRG: 310 | End: 2024-06-28
Attending: INTERNAL MEDICINE | Admitting: INTERNAL MEDICINE
Payer: COMMERCIAL

## 2024-06-27 VITALS
HEIGHT: 70 IN | WEIGHT: 229.94 LBS | OXYGEN SATURATION: 99 % | SYSTOLIC BLOOD PRESSURE: 160 MMHG | DIASTOLIC BLOOD PRESSURE: 90 MMHG | RESPIRATION RATE: 18 BRPM | TEMPERATURE: 98 F | HEART RATE: 136 BPM

## 2024-06-27 DIAGNOSIS — Z98.890 OTHER SPECIFIED POSTPROCEDURAL STATES: Chronic | ICD-10-CM

## 2024-06-27 DIAGNOSIS — I48.91 UNSPECIFIED ATRIAL FIBRILLATION: ICD-10-CM

## 2024-06-27 DIAGNOSIS — C48.0 MALIGNANT NEOPLASM OF RETROPERITONEUM: Chronic | ICD-10-CM

## 2024-06-27 DIAGNOSIS — Z96.642 PRESENCE OF LEFT ARTIFICIAL HIP JOINT: Chronic | ICD-10-CM

## 2024-06-27 DIAGNOSIS — Z96.641 PRESENCE OF RIGHT ARTIFICIAL HIP JOINT: Chronic | ICD-10-CM

## 2024-06-27 DIAGNOSIS — Z86.011 PERSONAL HISTORY OF BENIGN NEOPLASM OF THE BRAIN: Chronic | ICD-10-CM

## 2024-06-27 LAB
ALBUMIN SERPL ELPH-MCNC: 4.8 G/DL — SIGNIFICANT CHANGE UP (ref 3.5–5.2)
ALP SERPL-CCNC: 100 U/L — SIGNIFICANT CHANGE UP (ref 30–115)
ALT FLD-CCNC: 30 U/L — SIGNIFICANT CHANGE UP (ref 0–41)
ANION GAP SERPL CALC-SCNC: 12 MMOL/L — SIGNIFICANT CHANGE UP (ref 7–14)
APTT BLD: 32.5 SEC — SIGNIFICANT CHANGE UP (ref 27–39.2)
AST SERPL-CCNC: 29 U/L — SIGNIFICANT CHANGE UP (ref 0–41)
BASOPHILS # BLD AUTO: 0.04 K/UL — SIGNIFICANT CHANGE UP (ref 0–0.2)
BASOPHILS NFR BLD AUTO: 0.7 % — SIGNIFICANT CHANGE UP (ref 0–1)
BILIRUB SERPL-MCNC: 0.6 MG/DL — SIGNIFICANT CHANGE UP (ref 0.2–1.2)
BUN SERPL-MCNC: 19 MG/DL — SIGNIFICANT CHANGE UP (ref 10–20)
CALCIUM SERPL-MCNC: 10.4 MG/DL — SIGNIFICANT CHANGE UP (ref 8.4–10.4)
CHLORIDE SERPL-SCNC: 106 MMOL/L — SIGNIFICANT CHANGE UP (ref 98–110)
CO2 SERPL-SCNC: 26 MMOL/L — SIGNIFICANT CHANGE UP (ref 17–32)
CREAT SERPL-MCNC: 0.9 MG/DL — SIGNIFICANT CHANGE UP (ref 0.7–1.5)
EGFR: 71 ML/MIN/1.73M2 — SIGNIFICANT CHANGE UP
EOSINOPHIL # BLD AUTO: 0.08 K/UL — SIGNIFICANT CHANGE UP (ref 0–0.7)
EOSINOPHIL NFR BLD AUTO: 1.5 % — SIGNIFICANT CHANGE UP (ref 0–8)
GLUCOSE SERPL-MCNC: 117 MG/DL — HIGH (ref 70–99)
HCT VFR BLD CALC: 46.5 % — SIGNIFICANT CHANGE UP (ref 37–47)
HGB BLD-MCNC: 15.7 G/DL — SIGNIFICANT CHANGE UP (ref 12–16)
IMM GRANULOCYTES NFR BLD AUTO: 0.2 % — SIGNIFICANT CHANGE UP (ref 0.1–0.3)
INR BLD: 0.99 RATIO — SIGNIFICANT CHANGE UP (ref 0.65–1.3)
LYMPHOCYTES # BLD AUTO: 2.13 K/UL — SIGNIFICANT CHANGE UP (ref 1.2–3.4)
LYMPHOCYTES # BLD AUTO: 38.7 % — SIGNIFICANT CHANGE UP (ref 20.5–51.1)
MAGNESIUM SERPL-MCNC: 2.1 MG/DL — SIGNIFICANT CHANGE UP (ref 1.8–2.4)
MCHC RBC-ENTMCNC: 31 PG — SIGNIFICANT CHANGE UP (ref 27–31)
MCHC RBC-ENTMCNC: 33.8 G/DL — SIGNIFICANT CHANGE UP (ref 32–37)
MCV RBC AUTO: 91.7 FL — SIGNIFICANT CHANGE UP (ref 81–99)
MONOCYTES # BLD AUTO: 0.45 K/UL — SIGNIFICANT CHANGE UP (ref 0.1–0.6)
MONOCYTES NFR BLD AUTO: 8.2 % — SIGNIFICANT CHANGE UP (ref 1.7–9.3)
NEUTROPHILS # BLD AUTO: 2.79 K/UL — SIGNIFICANT CHANGE UP (ref 1.4–6.5)
NEUTROPHILS NFR BLD AUTO: 50.7 % — SIGNIFICANT CHANGE UP (ref 42.2–75.2)
NRBC # BLD: 0 /100 WBCS — SIGNIFICANT CHANGE UP (ref 0–0)
PLATELET # BLD AUTO: 226 K/UL — SIGNIFICANT CHANGE UP (ref 130–400)
PMV BLD: 10.7 FL — HIGH (ref 7.4–10.4)
POTASSIUM SERPL-MCNC: 3.9 MMOL/L — SIGNIFICANT CHANGE UP (ref 3.5–5)
POTASSIUM SERPL-SCNC: 3.9 MMOL/L — SIGNIFICANT CHANGE UP (ref 3.5–5)
PROT SERPL-MCNC: 7.4 G/DL — SIGNIFICANT CHANGE UP (ref 6–8)
PROTHROM AB SERPL-ACNC: 11.3 SEC — SIGNIFICANT CHANGE UP (ref 9.95–12.87)
RBC # BLD: 5.07 M/UL — SIGNIFICANT CHANGE UP (ref 4.2–5.4)
RBC # FLD: 13.2 % — SIGNIFICANT CHANGE UP (ref 11.5–14.5)
SODIUM SERPL-SCNC: 144 MMOL/L — SIGNIFICANT CHANGE UP (ref 135–146)
TROPONIN T, HIGH SENSITIVITY RESULT: 9 NG/L — SIGNIFICANT CHANGE UP (ref 6–13)
WBC # BLD: 5.5 K/UL — SIGNIFICANT CHANGE UP (ref 4.8–10.8)
WBC # FLD AUTO: 5.5 K/UL — SIGNIFICANT CHANGE UP (ref 4.8–10.8)

## 2024-06-27 PROCEDURE — 36415 COLL VENOUS BLD VENIPUNCTURE: CPT

## 2024-06-27 PROCEDURE — 93005 ELECTROCARDIOGRAM TRACING: CPT

## 2024-06-27 PROCEDURE — 84484 ASSAY OF TROPONIN QUANT: CPT

## 2024-06-27 PROCEDURE — 99291 CRITICAL CARE FIRST HOUR: CPT

## 2024-06-27 PROCEDURE — 93325 DOPPLER ECHO COLOR FLOW MAPG: CPT

## 2024-06-27 PROCEDURE — 80061 LIPID PANEL: CPT

## 2024-06-27 PROCEDURE — 71045 X-RAY EXAM CHEST 1 VIEW: CPT | Mod: 26

## 2024-06-27 PROCEDURE — 85379 FIBRIN DEGRADATION QUANT: CPT

## 2024-06-27 PROCEDURE — 85025 COMPLETE CBC W/AUTO DIFF WBC: CPT

## 2024-06-27 PROCEDURE — 93010 ELECTROCARDIOGRAM REPORT: CPT

## 2024-06-27 PROCEDURE — 80053 COMPREHEN METABOLIC PANEL: CPT

## 2024-06-27 PROCEDURE — 83735 ASSAY OF MAGNESIUM: CPT

## 2024-06-27 PROCEDURE — 83036 HEMOGLOBIN GLYCOSYLATED A1C: CPT

## 2024-06-27 PROCEDURE — 93320 DOPPLER ECHO COMPLETE: CPT

## 2024-06-27 PROCEDURE — 84443 ASSAY THYROID STIM HORMONE: CPT

## 2024-06-27 PROCEDURE — 92960 CARDIOVERSION ELECTRIC EXT: CPT

## 2024-06-27 PROCEDURE — 93308 TTE F-UP OR LMTD: CPT

## 2024-06-27 PROCEDURE — 93312 ECHO TRANSESOPHAGEAL: CPT

## 2024-06-27 RX ORDER — DILTIAZEM HYDROCHLORIDE 240 MG/1
10 CAPSULE, EXTENDED RELEASE ORAL ONCE
Refills: 0 | Status: COMPLETED | OUTPATIENT
Start: 2024-06-27 | End: 2024-06-27

## 2024-06-27 RX ORDER — APIXABAN 5 MG/1
5 TABLET, FILM COATED ORAL ONCE
Refills: 0 | Status: COMPLETED | OUTPATIENT
Start: 2024-06-27 | End: 2024-06-27

## 2024-06-27 RX ORDER — DILTIAZEM HYDROCHLORIDE 240 MG/1
5 CAPSULE, EXTENDED RELEASE ORAL
Qty: 125 | Refills: 0 | Status: DISCONTINUED | OUTPATIENT
Start: 2024-06-27 | End: 2024-06-28

## 2024-06-27 RX ADMIN — DILTIAZEM HYDROCHLORIDE 5 MG/HR: 240 CAPSULE, EXTENDED RELEASE ORAL at 21:29

## 2024-06-27 RX ADMIN — APIXABAN 5 MILLIGRAM(S): 5 TABLET, FILM COATED ORAL at 21:51

## 2024-06-27 RX ADMIN — DILTIAZEM HYDROCHLORIDE 10 MILLIGRAM(S): 240 CAPSULE, EXTENDED RELEASE ORAL at 21:11

## 2024-06-28 ENCOUNTER — TRANSCRIPTION ENCOUNTER (OUTPATIENT)
Age: 64
End: 2024-06-28

## 2024-06-28 VITALS
DIASTOLIC BLOOD PRESSURE: 79 MMHG | OXYGEN SATURATION: 99 % | TEMPERATURE: 98 F | HEIGHT: 70 IN | RESPIRATION RATE: 18 BRPM | WEIGHT: 229.94 LBS | SYSTOLIC BLOOD PRESSURE: 116 MMHG | HEART RATE: 126 BPM

## 2024-06-28 PROBLEM — J44.9 CHRONIC OBSTRUCTIVE PULMONARY DISEASE, UNSPECIFIED: Chronic | Status: INACTIVE | Noted: 2022-05-23 | Resolved: 2024-06-28

## 2024-06-28 LAB
A1C WITH ESTIMATED AVERAGE GLUCOSE RESULT: 5.5 % — SIGNIFICANT CHANGE UP (ref 4–5.6)
ALBUMIN SERPL ELPH-MCNC: 4.3 G/DL — SIGNIFICANT CHANGE UP (ref 3.5–5.2)
ALP SERPL-CCNC: 76 U/L — SIGNIFICANT CHANGE UP (ref 30–115)
ALT FLD-CCNC: 23 U/L — SIGNIFICANT CHANGE UP (ref 0–41)
ANION GAP SERPL CALC-SCNC: 8 MMOL/L — SIGNIFICANT CHANGE UP (ref 7–14)
AST SERPL-CCNC: 19 U/L — SIGNIFICANT CHANGE UP (ref 0–41)
BASOPHILS # BLD AUTO: 0.03 K/UL — SIGNIFICANT CHANGE UP (ref 0–0.2)
BASOPHILS NFR BLD AUTO: 0.6 % — SIGNIFICANT CHANGE UP (ref 0–1)
BILIRUB SERPL-MCNC: 0.6 MG/DL — SIGNIFICANT CHANGE UP (ref 0.2–1.2)
BUN SERPL-MCNC: 15 MG/DL — SIGNIFICANT CHANGE UP (ref 10–20)
CALCIUM SERPL-MCNC: 9.3 MG/DL — SIGNIFICANT CHANGE UP (ref 8.4–10.5)
CHLORIDE SERPL-SCNC: 109 MMOL/L — SIGNIFICANT CHANGE UP (ref 98–110)
CHOLEST SERPL-MCNC: 208 MG/DL — HIGH
CO2 SERPL-SCNC: 28 MMOL/L — SIGNIFICANT CHANGE UP (ref 17–32)
CREAT SERPL-MCNC: 0.6 MG/DL — LOW (ref 0.7–1.5)
D DIMER BLD IA.RAPID-MCNC: <150 NG/ML DDU — SIGNIFICANT CHANGE UP
EGFR: 100 ML/MIN/1.73M2 — SIGNIFICANT CHANGE UP
EOSINOPHIL # BLD AUTO: 0.04 K/UL — SIGNIFICANT CHANGE UP (ref 0–0.7)
EOSINOPHIL NFR BLD AUTO: 0.8 % — SIGNIFICANT CHANGE UP (ref 0–8)
ESTIMATED AVERAGE GLUCOSE: 111 MG/DL — SIGNIFICANT CHANGE UP (ref 68–114)
GLUCOSE SERPL-MCNC: 116 MG/DL — HIGH (ref 70–99)
HCT VFR BLD CALC: 42.6 % — SIGNIFICANT CHANGE UP (ref 37–47)
HDLC SERPL-MCNC: 78 MG/DL — SIGNIFICANT CHANGE UP
HGB BLD-MCNC: 14.2 G/DL — SIGNIFICANT CHANGE UP (ref 12–16)
IMM GRANULOCYTES NFR BLD AUTO: 0.2 % — SIGNIFICANT CHANGE UP (ref 0.1–0.3)
LIPID PNL WITH DIRECT LDL SERPL: 117 MG/DL — HIGH
LYMPHOCYTES # BLD AUTO: 1.37 K/UL — SIGNIFICANT CHANGE UP (ref 1.2–3.4)
LYMPHOCYTES # BLD AUTO: 28.8 % — SIGNIFICANT CHANGE UP (ref 20.5–51.1)
MAGNESIUM SERPL-MCNC: 2 MG/DL — SIGNIFICANT CHANGE UP (ref 1.8–2.4)
MCHC RBC-ENTMCNC: 31.1 PG — HIGH (ref 27–31)
MCHC RBC-ENTMCNC: 33.3 G/DL — SIGNIFICANT CHANGE UP (ref 32–37)
MCV RBC AUTO: 93.4 FL — SIGNIFICANT CHANGE UP (ref 81–99)
MONOCYTES # BLD AUTO: 0.39 K/UL — SIGNIFICANT CHANGE UP (ref 0.1–0.6)
MONOCYTES NFR BLD AUTO: 8.2 % — SIGNIFICANT CHANGE UP (ref 1.7–9.3)
NEUTROPHILS # BLD AUTO: 2.92 K/UL — SIGNIFICANT CHANGE UP (ref 1.4–6.5)
NEUTROPHILS NFR BLD AUTO: 61.4 % — SIGNIFICANT CHANGE UP (ref 42.2–75.2)
NON HDL CHOLESTEROL: 130 MG/DL — HIGH
NRBC # BLD: 0 /100 WBCS — SIGNIFICANT CHANGE UP (ref 0–0)
PLATELET # BLD AUTO: 216 K/UL — SIGNIFICANT CHANGE UP (ref 130–400)
PMV BLD: 10.4 FL — SIGNIFICANT CHANGE UP (ref 7.4–10.4)
POTASSIUM SERPL-MCNC: 4.7 MMOL/L — SIGNIFICANT CHANGE UP (ref 3.5–5)
POTASSIUM SERPL-SCNC: 4.7 MMOL/L — SIGNIFICANT CHANGE UP (ref 3.5–5)
PROT SERPL-MCNC: 6.4 G/DL — SIGNIFICANT CHANGE UP (ref 6–8)
RBC # BLD: 4.56 M/UL — SIGNIFICANT CHANGE UP (ref 4.2–5.4)
RBC # FLD: 13.4 % — SIGNIFICANT CHANGE UP (ref 11.5–14.5)
SODIUM SERPL-SCNC: 145 MMOL/L — SIGNIFICANT CHANGE UP (ref 135–146)
TRIGL SERPL-MCNC: 67 MG/DL — SIGNIFICANT CHANGE UP
TROPONIN T, HIGH SENSITIVITY RESULT: 11 NG/L — SIGNIFICANT CHANGE UP (ref 6–13)
TSH SERPL-MCNC: 1.11 UIU/ML — SIGNIFICANT CHANGE UP (ref 0.27–4.2)
WBC # BLD: 4.76 K/UL — LOW (ref 4.8–10.8)
WBC # FLD AUTO: 4.76 K/UL — LOW (ref 4.8–10.8)

## 2024-06-28 PROCEDURE — 99236 HOSP IP/OBS SAME DATE HI 85: CPT

## 2024-06-28 PROCEDURE — 93010 ELECTROCARDIOGRAM REPORT: CPT

## 2024-06-28 RX ORDER — APIXABAN 5 MG/1
1 TABLET, FILM COATED ORAL
Qty: 0 | Refills: 0 | DISCHARGE
Start: 2024-06-28

## 2024-06-28 RX ORDER — APIXABAN 5 MG/1
5 TABLET, FILM COATED ORAL EVERY 12 HOURS
Refills: 0 | Status: DISCONTINUED | OUTPATIENT
Start: 2024-06-28 | End: 2024-06-28

## 2024-06-28 RX ORDER — MAGNESIUM, ALUMINUM HYDROXIDE 400-400
30 TABLET,CHEWABLE ORAL EVERY 4 HOURS
Refills: 0 | Status: DISCONTINUED | OUTPATIENT
Start: 2024-06-28 | End: 2024-06-28

## 2024-06-28 RX ORDER — DILTIAZEM HYDROCHLORIDE 240 MG/1
1 CAPSULE, EXTENDED RELEASE ORAL
Qty: 30 | Refills: 3
Start: 2024-06-28 | End: 2024-10-25

## 2024-06-28 RX ORDER — ONDANSETRON HYDROCHLORIDE 2 MG/ML
4 INJECTION INTRAMUSCULAR; INTRAVENOUS EVERY 8 HOURS
Refills: 0 | Status: DISCONTINUED | OUTPATIENT
Start: 2024-06-28 | End: 2024-06-28

## 2024-06-28 RX ORDER — DILTIAZEM HYDROCHLORIDE 240 MG/1
360 CAPSULE, EXTENDED RELEASE ORAL DAILY
Refills: 0 | Status: DISCONTINUED | OUTPATIENT
Start: 2024-06-28 | End: 2024-06-28

## 2024-06-28 RX ORDER — PANTOPRAZOLE SODIUM 40 MG/10ML
40 INJECTION, POWDER, FOR SOLUTION INTRAVENOUS
Refills: 0 | Status: DISCONTINUED | OUTPATIENT
Start: 2024-06-28 | End: 2024-06-28

## 2024-06-28 RX ADMIN — APIXABAN 5 MILLIGRAM(S): 5 TABLET, FILM COATED ORAL at 07:51

## 2024-06-28 RX ADMIN — DILTIAZEM HYDROCHLORIDE 360 MILLIGRAM(S): 240 CAPSULE, EXTENDED RELEASE ORAL at 08:54

## 2024-07-05 ENCOUNTER — OUTPATIENT (OUTPATIENT)
Dept: OUTPATIENT SERVICES | Facility: HOSPITAL | Age: 64
LOS: 1 days | End: 2024-07-05
Payer: COMMERCIAL

## 2024-07-05 VITALS
HEIGHT: 70 IN | HEART RATE: 76 BPM | RESPIRATION RATE: 16 BRPM | WEIGHT: 233.03 LBS | OXYGEN SATURATION: 98 % | SYSTOLIC BLOOD PRESSURE: 129 MMHG | TEMPERATURE: 99 F | DIASTOLIC BLOOD PRESSURE: 81 MMHG

## 2024-07-05 DIAGNOSIS — Z86.011 PERSONAL HISTORY OF BENIGN NEOPLASM OF THE BRAIN: Chronic | ICD-10-CM

## 2024-07-05 DIAGNOSIS — C48.0 MALIGNANT NEOPLASM OF RETROPERITONEUM: Chronic | ICD-10-CM

## 2024-07-05 DIAGNOSIS — Z96.642 PRESENCE OF LEFT ARTIFICIAL HIP JOINT: Chronic | ICD-10-CM

## 2024-07-05 DIAGNOSIS — Z98.890 OTHER SPECIFIED POSTPROCEDURAL STATES: Chronic | ICD-10-CM

## 2024-07-05 DIAGNOSIS — Z96.641 PRESENCE OF RIGHT ARTIFICIAL HIP JOINT: Chronic | ICD-10-CM

## 2024-07-05 DIAGNOSIS — D03.39 MELANOMA IN SITU OF OTHER PARTS OF FACE: ICD-10-CM

## 2024-07-05 DIAGNOSIS — Z92.89 PERSONAL HISTORY OF OTHER MEDICAL TREATMENT: Chronic | ICD-10-CM

## 2024-07-05 DIAGNOSIS — Z01.818 ENCOUNTER FOR OTHER PREPROCEDURAL EXAMINATION: ICD-10-CM

## 2024-07-05 PROCEDURE — 99214 OFFICE O/P EST MOD 30 MIN: CPT | Mod: 25

## 2024-07-05 PROCEDURE — 93005 ELECTROCARDIOGRAM TRACING: CPT

## 2024-07-05 PROCEDURE — 93010 ELECTROCARDIOGRAM REPORT: CPT

## 2024-07-06 DIAGNOSIS — G47.33 OBSTRUCTIVE SLEEP APNEA (ADULT) (PEDIATRIC): ICD-10-CM

## 2024-07-06 DIAGNOSIS — Z01.818 ENCOUNTER FOR OTHER PREPROCEDURAL EXAMINATION: ICD-10-CM

## 2024-07-06 DIAGNOSIS — Z85.828 PERSONAL HISTORY OF OTHER MALIGNANT NEOPLASM OF SKIN: ICD-10-CM

## 2024-07-06 DIAGNOSIS — Z85.820 PERSONAL HISTORY OF MALIGNANT MELANOMA OF SKIN: ICD-10-CM

## 2024-07-06 DIAGNOSIS — I48.91 UNSPECIFIED ATRIAL FIBRILLATION: ICD-10-CM

## 2024-07-06 DIAGNOSIS — Z96.643 PRESENCE OF ARTIFICIAL HIP JOINT, BILATERAL: ICD-10-CM

## 2024-07-06 DIAGNOSIS — D03.39 MELANOMA IN SITU OF OTHER PARTS OF FACE: ICD-10-CM

## 2024-07-06 DIAGNOSIS — J44.9 CHRONIC OBSTRUCTIVE PULMONARY DISEASE, UNSPECIFIED: ICD-10-CM

## 2024-07-06 DIAGNOSIS — Z86.011 PERSONAL HISTORY OF BENIGN NEOPLASM OF THE BRAIN: ICD-10-CM

## 2024-07-08 ENCOUNTER — NON-APPOINTMENT (OUTPATIENT)
Age: 64
End: 2024-07-08

## 2024-07-08 DIAGNOSIS — M79.2 NEURALGIA AND NEURITIS, UNSPECIFIED: ICD-10-CM

## 2024-07-08 RX ORDER — GABAPENTIN 300 MG/1
300 CAPSULE ORAL
Qty: 7 | Refills: 0 | Status: ACTIVE | COMMUNITY
Start: 2024-07-08 | End: 1900-01-01

## 2024-07-12 NOTE — ASU PATIENT PROFILE, ADULT - NSICDXPASTMEDICALHX_GEN_ALL_CORE_FT
PAST MEDICAL HISTORY:  CA skin, basal cell     Meningioma     Obstructive sleep apnea     Skin melanoma

## 2024-07-12 NOTE — ASU PATIENT PROFILE, ADULT - NS PRO ABUSE SCREEN SUSPICION NEGLECT YN
Problem: Diabetes Self-Management  Goal: *Disease process and treatment process  Description  Define diabetes and identify own type of diabetes; list 3 options for treating diabetes. Outcome: Progressing Towards Goal  Goal: *Incorporating nutritional management into lifestyle  Description  Describe effect of type, amount and timing of food on blood glucose; list 3 methods for planning meals. Outcome: Progressing Towards Goal  Goal: *Incorporating physical activity into lifestyle  Description  State effect of exercise on blood glucose levels. Outcome: Progressing Towards Goal  Goal: *Developing strategies to promote health/change behavior  Description  Define the ABC's of diabetes; identify appropriate screenings, schedule and personal plan for screenings. Outcome: Progressing Towards Goal  Goal: *Using medications safely  Description  State effect of diabetes medications on diabetes; name diabetes medication taking, action and side effects. Outcome: Progressing Towards Goal  Goal: *Monitoring blood glucose, interpreting and using results  Description  Identify recommended blood glucose targets  and personal targets. Outcome: Progressing Towards Goal  Goal: *Prevention, detection, treatment of acute complications  Description  List symptoms of hyper- and hypoglycemia; describe how to treat low blood sugar and actions for lowering  high blood glucose level. Outcome: Progressing Towards Goal  Goal: *Prevention, detection and treatment of chronic complications  Description  Define the natural course of diabetes and describe the relationship of blood glucose levels to long term complications of diabetes.   Outcome: Progressing Towards Goal  Goal: *Developing strategies to address psychosocial issues  Description  Describe feelings about living with diabetes; identify support needed and support network  Outcome: Progressing Towards Goal     Problem: Patient Education: Go to Patient Education Activity  Goal: Patient/Family Education  Outcome: Progressing Towards Goal     Problem: Falls - Risk of  Goal: *Absence of Falls  Description  Document Josue Schmid Fall Risk and appropriate interventions in the flowsheet. Outcome: Progressing Towards Goal  Note: Fall Risk Interventions:  Mobility Interventions: Bed/chair exit alarm         Medication Interventions: Teach patient to arise slowly    Elimination Interventions: Call light in reach              Problem: Patient Education: Go to Patient Education Activity  Goal: Patient/Family Education  Outcome: Progressing Towards Goal     Problem: Pressure Injury - Risk of  Goal: *Prevention of pressure injury  Description  Document Taiwo Scale and appropriate interventions in the flowsheet.   Outcome: Progressing Towards Goal  Note: Pressure Injury Interventions:  Sensory Interventions: Assess changes in LOC    Moisture Interventions: Absorbent underpads    Activity Interventions: Assess need for specialty bed    Mobility Interventions: Assess need for specialty bed, PT/OT evaluation, HOB 30 degrees or less    Nutrition Interventions: Document food/fluid/supplement intake, Offer support with meals,snacks and hydration    Friction and Shear Interventions: HOB 30 degrees or less, Minimize layers                Problem: Patient Education: Go to Patient Education Activity  Goal: Patient/Family Education  Outcome: Progressing Towards Goal     Problem: Patient Education: Go to Patient Education Activity  Goal: Patient/Family Education  Outcome: Progressing Towards Goal     Problem: TIA/CVA Stroke: Day 2 Until Discharge  Goal: Activity/Safety  Outcome: Progressing Towards Goal  Goal: Diagnostic Test/Procedures  Outcome: Progressing Towards Goal  Goal: Nutrition/Diet  Outcome: Progressing Towards Goal  Goal: Discharge Planning  Outcome: Progressing Towards Goal  Goal: Medications  Outcome: Progressing Towards Goal  Goal: Respiratory  Outcome: Progressing Towards Goal  Goal: Treatments/Interventions/Procedures  Outcome: Progressing Towards Goal  Goal: Psychosocial  Outcome: Progressing Towards Goal  Goal: *Verbalizes anxiety and depression are reduced or absent  Outcome: Progressing Towards Goal  Goal: *Absence of aspiration  Outcome: Progressing Towards Goal  Goal: *Absence of deep venous thrombosis signs and symptoms(Stroke Metric)  Outcome: Progressing Towards Goal  Goal: *Optimal pain control at patient's stated goal  Outcome: Progressing Towards Goal  Goal: *Tolerating diet  Outcome: Progressing Towards Goal  Goal: *Ability to perform ADLs and demonstrates progressive mobility and function  Outcome: Progressing Towards Goal  Goal: *Stroke education continued(Stroke Metric)  Outcome: Progressing Towards Goal     Problem: Patient Education: Go to Patient Education Activity  Goal: Patient/Family Education  Outcome: Progressing Towards Goal     Problem: Patient Education: Go to Patient Education Activity  Goal: Patient/Family Education  Outcome: Progressing Towards Goal no

## 2024-07-15 ENCOUNTER — APPOINTMENT (OUTPATIENT)
Dept: PLASTIC SURGERY | Facility: AMBULATORY SURGERY CENTER | Age: 64
End: 2024-07-15
Payer: COMMERCIAL

## 2024-07-15 ENCOUNTER — TRANSCRIPTION ENCOUNTER (OUTPATIENT)
Age: 64
End: 2024-07-15

## 2024-07-15 ENCOUNTER — RESULT REVIEW (OUTPATIENT)
Age: 64
End: 2024-07-15

## 2024-07-15 ENCOUNTER — OUTPATIENT (OUTPATIENT)
Dept: OUTPATIENT SERVICES | Facility: HOSPITAL | Age: 64
LOS: 1 days | Discharge: HOME | End: 2024-07-15
Payer: COMMERCIAL

## 2024-07-15 VITALS
OXYGEN SATURATION: 95 % | HEART RATE: 60 BPM | SYSTOLIC BLOOD PRESSURE: 95 MMHG | RESPIRATION RATE: 16 BRPM | DIASTOLIC BLOOD PRESSURE: 53 MMHG

## 2024-07-15 VITALS
SYSTOLIC BLOOD PRESSURE: 114 MMHG | HEART RATE: 68 BPM | TEMPERATURE: 98 F | RESPIRATION RATE: 18 BRPM | OXYGEN SATURATION: 97 % | DIASTOLIC BLOOD PRESSURE: 74 MMHG | WEIGHT: 233.03 LBS | HEIGHT: 70 IN

## 2024-07-15 DIAGNOSIS — Z96.642 PRESENCE OF LEFT ARTIFICIAL HIP JOINT: Chronic | ICD-10-CM

## 2024-07-15 DIAGNOSIS — Z98.890 OTHER SPECIFIED POSTPROCEDURAL STATES: Chronic | ICD-10-CM

## 2024-07-15 DIAGNOSIS — D03.39 MELANOMA IN SITU OF OTHER PARTS OF FACE: ICD-10-CM

## 2024-07-15 DIAGNOSIS — C48.0 MALIGNANT NEOPLASM OF RETROPERITONEUM: Chronic | ICD-10-CM

## 2024-07-15 DIAGNOSIS — Z96.641 PRESENCE OF RIGHT ARTIFICIAL HIP JOINT: Chronic | ICD-10-CM

## 2024-07-15 DIAGNOSIS — Z92.89 PERSONAL HISTORY OF OTHER MEDICAL TREATMENT: Chronic | ICD-10-CM

## 2024-07-15 PROCEDURE — 88307 TISSUE EXAM BY PATHOLOGIST: CPT | Mod: 26

## 2024-07-15 PROCEDURE — 14041 TIS TRNFR F/C/C/M/N/A/G/H/F: CPT

## 2024-07-15 PROCEDURE — 88307 TISSUE EXAM BY PATHOLOGIST: CPT

## 2024-07-15 RX ORDER — OXYCODONE HYDROCHLORIDE 100 MG/5ML
5 SOLUTION ORAL ONCE
Refills: 0 | Status: DISCONTINUED | OUTPATIENT
Start: 2024-07-15 | End: 2024-07-15

## 2024-07-15 RX ORDER — DEXTROSE MONOHYDRATE AND SODIUM CHLORIDE 5; .3 G/100ML; G/100ML
1000 INJECTION, SOLUTION INTRAVENOUS
Refills: 0 | Status: DISCONTINUED | OUTPATIENT
Start: 2024-07-15 | End: 2024-07-15

## 2024-07-15 RX ORDER — ONDANSETRON HYDROCHLORIDE 2 MG/ML
4 INJECTION INTRAMUSCULAR; INTRAVENOUS ONCE
Refills: 0 | Status: DISCONTINUED | OUTPATIENT
Start: 2024-07-15 | End: 2024-07-15

## 2024-07-15 RX ORDER — ACETAMINOPHEN 325 MG
2 TABLET ORAL
Refills: 0 | DISCHARGE

## 2024-07-15 RX ORDER — HYDROMORPHONE HCL 0.2 MG/ML
0.5 INJECTION, SOLUTION INTRAVENOUS
Refills: 0 | Status: DISCONTINUED | OUTPATIENT
Start: 2024-07-15 | End: 2024-07-15

## 2024-07-15 RX ORDER — TRAMADOL HYDROCHLORIDE 50 MG/1
1 TABLET, FILM COATED ORAL
Qty: 10 | Refills: 0
Start: 2024-07-15 | End: 2024-07-17

## 2024-07-15 RX ORDER — ACETAMINOPHEN 325 MG
1000 TABLET ORAL ONCE
Refills: 0 | Status: DISCONTINUED | OUTPATIENT
Start: 2024-07-15 | End: 2024-07-15

## 2024-07-15 RX ORDER — GABAPENTIN
1 POWDER (GRAM) MISCELLANEOUS
Refills: 0 | DISCHARGE

## 2024-07-15 NOTE — PRE-ANESTHESIA EVALUATION ADULT - NSANTHPMHFT_GEN_ALL_CORE
new afib, s/p cardioversion in ED, now in SR. On Eliquis - last dose yesterday.  METS>4 without CP/palpitations/sob  Denies orthopnea

## 2024-07-15 NOTE — ASU DISCHARGE PLAN (ADULT/PEDIATRIC) - ASU DC SPECIAL INSTRUCTIONSFT
Keep surgical site clean and dry.   Do not remove any dressings or get them wet.   Sleep on your back with head elevated to reduce swelling.   Do not be alarmed by facial bruising, swelling and minor bleeding, that's expected.   May apply ice pack on-and-off for the first 24 hours to the cheek to reduce swelling.   Rest, no lifting.

## 2024-07-15 NOTE — ASU DISCHARGE PLAN (ADULT/PEDIATRIC) - PAIN MANAGEMENT
Tramadol for severe pain only/Take over the counter pain medication/Prescriptions electronically submitted to pharmacy from [facility]

## 2024-07-15 NOTE — PRE-ANESTHESIA EVALUATION ADULT - HEIGHT IN CM
Received request via: Patient    Was the patient seen in the last year in this department? Yes    Does the patient have an active prescription (recently filled or refills available) for medication(s) requested? Patient states she has never used 70/30 insulin and would like this corrected, as she always uses regular and NPH  
Requested Prescriptions     Signed Prescriptions Disp Refills   • insulin NPH (HUMULIN/NOVOLIN) 100 UNIT/ML Suspension 10 mL 1     Si units in the morning and 3-10 units in the evening per sliding scale     Authorizing Provider: FELIZ MCKENNA A.P.R.N.   
177.8

## 2024-07-15 NOTE — ASU DISCHARGE PLAN (ADULT/PEDIATRIC) - CARE PROVIDER_API CALL
Gaetano Tejada  Plastic Surgery  [address] [place] [id number]  Phone: [phone]  Fax: [phone]  Follow Up Time:

## 2024-07-15 NOTE — BRIEF OPERATIVE NOTE - NSICDXBRIEFPROCEDURE_GEN_ALL_CORE_FT
PROCEDURES:  Excision of malignant skin lesion of face, 2.1 cm to 3.0 cm in diameter 15-Jul-2024 10:18:01 right cheek with local tissue rearrangement Iris Roldan

## 2024-07-15 NOTE — ASU PREOP CHECKLIST - NS PREOP CHK MONITOR ANESTHESIA CONSENT
Contacted [facility] and she states they do not want to switch to comfort care measures at this time. Dee Dee would like to speak to Dr Radford personally after 12 PM if possible, please advise.   done

## 2024-07-18 LAB — SURGICAL PATHOLOGY STUDY: SIGNIFICANT CHANGE UP

## 2024-07-22 ENCOUNTER — APPOINTMENT (OUTPATIENT)
Dept: PLASTIC SURGERY | Facility: CLINIC | Age: 64
End: 2024-07-22
Payer: COMMERCIAL

## 2024-07-22 DIAGNOSIS — D22.39 MELANOCYTIC NEVI OF OTHER PARTS OF FACE: ICD-10-CM

## 2024-07-22 DIAGNOSIS — L57.8 OTHER SKIN CHANGES DUE TO CHRONIC EXPOSURE TO NONIONIZING RADIATION: ICD-10-CM

## 2024-07-22 DIAGNOSIS — D03.39 MELANOMA IN SITU OF OTHER PARTS OF FACE: ICD-10-CM

## 2024-07-22 DIAGNOSIS — I48.91 UNSPECIFIED ATRIAL FIBRILLATION: ICD-10-CM

## 2024-07-22 DIAGNOSIS — G47.33 OBSTRUCTIVE SLEEP APNEA (ADULT) (PEDIATRIC): ICD-10-CM

## 2024-07-22 DIAGNOSIS — Z87.891 PERSONAL HISTORY OF NICOTINE DEPENDENCE: ICD-10-CM

## 2024-07-22 DIAGNOSIS — Z96.643 PRESENCE OF ARTIFICIAL HIP JOINT, BILATERAL: ICD-10-CM

## 2024-07-22 PROBLEM — D32.9 BENIGN NEOPLASM OF MENINGES, UNSPECIFIED: Chronic | Status: ACTIVE | Noted: 2024-06-28

## 2024-07-22 PROBLEM — C43.9 MALIGNANT MELANOMA OF SKIN, UNSPECIFIED: Chronic | Status: ACTIVE | Noted: 2024-06-28

## 2024-07-22 PROCEDURE — 99024 POSTOP FOLLOW-UP VISIT: CPT

## 2024-07-22 NOTE — HISTORY OF PRESENT ILLNESS
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 Hui and presents today to discuss reconstructive options. Pt s/p WLE of left lower leg melanoma with neg LNB, by . Pt states she healed well without any issues. Pt denies any h/o fever/chills, fatigue or unintentional wt loss.   Denies h/o DVT/PE or MRSA infection No h/o DM Non-smoker Occupation: works from home  Interval hx (7/22/24). Pt presents today POD#7 s/p excision of right cheek melanoma in-situ with LTR. Doing very well with no significant pain, f/c or bleeding. Took all prescribed medications as instructed.

## 2024-07-22 NOTE — PHYSICAL EXAM
[de-identified] : Well developed, well nourished in NAD  [de-identified] : No palpable lymphadenopathy [de-identified] : Non labored on RA [de-identified] : Right preauricular incision healing well, c/d/i, no erythema or bruising

## 2024-07-22 NOTE — DATA REVIEWED
[FreeTextEntry1] : [facility] Accession Number : [id number] Patient:     CRISTIN WHITT   Accession:                             [id number]  Collected Date/Time:                   7/15/2024 09:37 EDT Received Date/Time:                    7/15/2024 13:20 EDT  Surgical Pathology Report - Auth (Verified)  Specimen(s) Submitted Right cheek melanoma stitch 12'oclock  Final Diagnosis Right cheek melanoma in situ, excision: - Intradermal melanocytic nevus. - Extensive solar elastosis and healing biopsy site changes. - No evidence of residual melanoma in-situ.  - See comment Verified by: Federico Graves M.D (Electronic Signature) Reported on: 07/18/24 14:34 EDT, [place] [address] Phone: [phone]   Fax: [phone] ____________________________________________________________

## 2024-07-22 NOTE — ASSESSMENT
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   Now POD#7 s/p excision of right cheek melanoma in-situ with LTR. Doing well.   - sutures removed, steri strips applied - may shower, no submerging - daily Aquaphor  - start Scarguard in 1-2 weeks  - pathology discussed, no residual MM in situ, intradermal nevus, solar elastosis, report given to pt - post-op instructions reviewed and all her questions were answered - f/u 1 month with Dr. Tejada.

## 2024-08-15 ENCOUNTER — APPOINTMENT (OUTPATIENT)
Dept: PLASTIC SURGERY | Facility: CLINIC | Age: 64
End: 2024-08-15
Payer: COMMERCIAL

## 2024-08-15 PROBLEM — Z86.79 PERSONAL HISTORY OF OTHER DISEASES OF THE CIRCULATORY SYSTEM: Chronic | Status: ACTIVE | Noted: 2024-07-05

## 2024-08-15 PROBLEM — Z86.011 PERSONAL HISTORY OF BENIGN NEOPLASM OF THE BRAIN: Chronic | Status: ACTIVE | Noted: 2024-07-05

## 2024-08-15 PROCEDURE — 99024 POSTOP FOLLOW-UP VISIT: CPT

## 2024-08-15 NOTE — PHYSICAL EXAM
[de-identified] : Well developed, well nourished in NAD  [de-identified] : No palpable lymphadenopathy [de-identified] : Non labored on RA [de-identified] : Right preauricular incision healing well, c/d/i, no erythema or bruising

## 2024-08-15 NOTE — DATA REVIEWED
[FreeTextEntry1] : Caleb Accession Number : [id number] Patient:     CRISTIN WHITT   Accession:                             [id number]  Collected Date/Time:                   7/15/2024 09:37 EDT Received Date/Time:                    7/15/2024 13:20 EDT  Surgical Pathology Report - Auth (Verified)  Specimen(s) Submitted Right cheek melanoma stitch 12'oclock  Final Diagnosis Right cheek melanoma in situ, excision: - Intradermal melanocytic nevus. - Extensive solar elastosis and healing biopsy site changes. - No evidence of residual melanoma in-situ.  - See comment Verified by: Federico Graves M.D (Electronic Signature) Reported on: 07/18/24 14:34 EDT, [place] [address] Phone: [phone]   Fax: [phone] ____________________________________________________________

## 2024-08-15 NOTE — ASSESSMENT
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   Now POD#7 s/p excision of right cheek melanoma in-situ with LTR. Doing well.   - sutures removed, steri strips applied - may shower, no submerging - daily Aquaphor  - start Scarguard in 1-2 weeks  - pathology discussed, no residual MM in situ, intradermal nevus, solar elastosis, report given to pt - post-op instructions reviewed and all her questions were answered - f/u 1 month with Dr. Tejada.   8/15/2024 right prearuicualr scarring excellent pt very happy has not started scarguard--was back ordered pt prefers not to do scarguard massage instructions  wound fine no evidence of recurrence excellent cosmetic result this early--we both agree  derm surveillance f/u w me in 6 months or sooner if the need arises

## 2024-08-15 NOTE — HISTORY OF PRESENT ILLNESS
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 Hui and presents today to discuss reconstructive options. Pt s/p WLE of left lower leg melanoma with neg LNB, by . Pt states she healed well without any issues. Pt denies any h/o fever/chills, fatigue or unintentional wt loss.   Denies h/o DVT/PE or MRSA infection No h/o DM Non-smoker Occupation: works from home  Interval hx (7/22/24). Pt presents today POD#7 s/p excision of right cheek melanoma in-situ with LTR. Doing very well with no significant pain, f/c or bleeding. Took all prescribed medications as instructed.

## 2024-08-28 ENCOUNTER — NON-APPOINTMENT (OUTPATIENT)
Age: 64
End: 2024-08-28

## 2024-12-09 ENCOUNTER — APPOINTMENT (OUTPATIENT)
Dept: SURGERY | Facility: CLINIC | Age: 64
End: 2024-12-09
Payer: MEDICARE

## 2024-12-09 VITALS
HEIGHT: 70 IN | OXYGEN SATURATION: 97 % | DIASTOLIC BLOOD PRESSURE: 86 MMHG | WEIGHT: 234 LBS | SYSTOLIC BLOOD PRESSURE: 136 MMHG | HEART RATE: 85 BPM | BODY MASS INDEX: 33.5 KG/M2

## 2024-12-09 DIAGNOSIS — G47.33 OBSTRUCTIVE SLEEP APNEA (ADULT) (PEDIATRIC): ICD-10-CM

## 2024-12-09 PROCEDURE — 99213 OFFICE O/P EST LOW 20 MIN: CPT

## 2025-02-04 ENCOUNTER — APPOINTMENT (OUTPATIENT)
Dept: PLASTIC SURGERY | Facility: CLINIC | Age: 65
End: 2025-02-04
Payer: MEDICARE

## 2025-02-04 PROCEDURE — 99202 OFFICE O/P NEW SF 15 MIN: CPT

## 2025-02-04 PROCEDURE — G2211 COMPLEX E/M VISIT ADD ON: CPT

## 2025-02-18 ENCOUNTER — APPOINTMENT (OUTPATIENT)
Dept: ORTHOPEDIC SURGERY | Facility: CLINIC | Age: 65
End: 2025-02-18
Payer: MEDICARE

## 2025-02-18 DIAGNOSIS — M25.562 PAIN IN LEFT KNEE: ICD-10-CM

## 2025-02-18 PROCEDURE — 99204 OFFICE O/P NEW MOD 45 MIN: CPT

## 2025-03-11 ENCOUNTER — TRANSCRIPTION ENCOUNTER (OUTPATIENT)
Age: 65
End: 2025-03-11

## 2025-03-11 ENCOUNTER — OUTPATIENT (OUTPATIENT)
Dept: OUTPATIENT SERVICES | Facility: HOSPITAL | Age: 65
LOS: 1 days | Discharge: HOME | End: 2025-03-11
Payer: MEDICARE

## 2025-03-11 VITALS
DIASTOLIC BLOOD PRESSURE: 83 MMHG | HEART RATE: 77 BPM | WEIGHT: 229.94 LBS | TEMPERATURE: 97 F | OXYGEN SATURATION: 96 % | RESPIRATION RATE: 18 BRPM | HEIGHT: 70 IN | SYSTOLIC BLOOD PRESSURE: 139 MMHG

## 2025-03-11 VITALS
TEMPERATURE: 97 F | RESPIRATION RATE: 16 BRPM | HEART RATE: 65 BPM | SYSTOLIC BLOOD PRESSURE: 114 MMHG | DIASTOLIC BLOOD PRESSURE: 76 MMHG | OXYGEN SATURATION: 100 %

## 2025-03-11 DIAGNOSIS — Z98.890 OTHER SPECIFIED POSTPROCEDURAL STATES: Chronic | ICD-10-CM

## 2025-03-11 DIAGNOSIS — Z12.11 ENCOUNTER FOR SCREENING FOR MALIGNANT NEOPLASM OF COLON: ICD-10-CM

## 2025-03-11 DIAGNOSIS — Z92.89 PERSONAL HISTORY OF OTHER MEDICAL TREATMENT: Chronic | ICD-10-CM

## 2025-03-11 DIAGNOSIS — Z96.641 PRESENCE OF RIGHT ARTIFICIAL HIP JOINT: Chronic | ICD-10-CM

## 2025-03-11 DIAGNOSIS — K64.4 RESIDUAL HEMORRHOIDAL SKIN TAGS: ICD-10-CM

## 2025-03-11 DIAGNOSIS — C48.0 MALIGNANT NEOPLASM OF RETROPERITONEUM: Chronic | ICD-10-CM

## 2025-03-11 DIAGNOSIS — Z96.642 PRESENCE OF LEFT ARTIFICIAL HIP JOINT: Chronic | ICD-10-CM

## 2025-03-11 DIAGNOSIS — D17.5 BENIGN LIPOMATOUS NEOPLASM OF INTRA-ABDOMINAL ORGANS: ICD-10-CM

## 2025-03-11 DIAGNOSIS — I48.91 UNSPECIFIED ATRIAL FIBRILLATION: ICD-10-CM

## 2025-03-11 DIAGNOSIS — G47.33 OBSTRUCTIVE SLEEP APNEA (ADULT) (PEDIATRIC): ICD-10-CM

## 2025-03-11 DIAGNOSIS — Z85.820 PERSONAL HISTORY OF MALIGNANT MELANOMA OF SKIN: ICD-10-CM

## 2025-03-11 NOTE — ASU DISCHARGE PLAN (ADULT/PEDIATRIC) - CARE PROVIDER_API CALL
Petey Mcclellan  Gastroenterology  [address] [place] [address]  Phone: [phone]  Fax: [phone]  Follow Up Time:

## 2025-03-11 NOTE — ASU DISCHARGE PLAN (ADULT/PEDIATRIC) - FINANCIAL ASSISTANCE
[facility] provides services at a reduced cost to those who are determined to be eligible through [facility]’s financial assistance program. Information regarding [facility]’s financial assistance program can be found by going to [email or web address] or by calling [phone].

## 2025-03-11 NOTE — ASU DISCHARGE PLAN (ADULT/PEDIATRIC) - NS MD DC FALL RISK RISK
For information on Fall & Injury Prevention, visit: [email or web address] OR  [email or web address] OR  https://www.cdc.gov/steadi/patient.html

## 2025-03-11 NOTE — ASU PATIENT PROFILE, ADULT - FALL HARM RISK - HARM RISK INTERVENTIONS

## 2025-03-11 NOTE — ASU PATIENT PROFILE, ADULT - NSICDXPASTMEDICALHX_GEN_ALL_CORE_FT
PAST MEDICAL HISTORY:  CA skin, basal cell     H/O meningioma of the brain excsion x 2    History of atrial fibrillation     Meningioma     Obstructive sleep apnea     Skin melanoma

## 2025-03-11 NOTE — ASU PATIENT PROFILE, ADULT - NSICDXPASTSURGICALHX_GEN_ALL_CORE_FT
PAST SURGICAL HISTORY:  H/O melanoma excision     History of cardioversion     History of D&C     History of incisional hernia repair     History of total hip replacement, left     History of total hip replacement, right     Retroperitoneal liposarcoma removal of tumor    S/P lumpectomy of breast benign

## 2025-03-11 NOTE — ASU PREOP CHECKLIST - ANTIBIOTIC
09/03/19 0900   CM/SW Referral Data   Referral Source Physician   Reason for Referral Discharge planning   Informant Patient   Social History   Recreational Drug/Alcohol Use no   Major Changes Last 6 Months no   Domestic/Partner Violence no   Suicidal I
n/a

## 2025-03-11 NOTE — CHART NOTE - NSCHARTNOTEFT_GEN_A_CORE
PACU ANESTHESIA ADMISSION NOTE      Procedure: colonoscopy      __x__  Patent Airway    __x__  Full return of protective reflexes    __x__  Full recovery from anesthesia / back to baseline status    Vitals:  T(C): 36.3 (03-11-25 @ 11:36), Max: 36.3 (03-11-25 @ 11:01)  HR: 77 (03-11-25 @ 11:36) (77 - 77)  BP: 139/83 (03-11-25 @ 11:36) (139/83 - 139/83)  RR: 18 (03-11-25 @ 11:36) (18 - 18)  SpO2: 96% (03-11-25 @ 11:36) (96% - 96%)    Mental Status:  __x__ Awake   ___x__ Alert   _____ Drowsy   _____ Sedated    Nausea/Vomiting:  __x__ NO  ______Yes,   See Post - Op Orders          Pain Scale (0-10):  _____    Treatment: ____ None    __x__ See Post - Op/PCA Orders    Post - Operative Fluids:   ____ Oral   __x__ See Post - Op Orders    Plan: Discharge:   __x__Home       _____Floor     _____Critical Care    _____  Other:_________________    Comments: Patient had smooth intraoperative event, no anesthesia complication. Vital signs stable, Report given to RN

## 2025-04-01 ENCOUNTER — APPOINTMENT (OUTPATIENT)
Dept: ORTHOPEDIC SURGERY | Facility: CLINIC | Age: 65
End: 2025-04-01

## 2025-04-15 ENCOUNTER — APPOINTMENT (OUTPATIENT)
Dept: ORTHOPEDIC SURGERY | Facility: CLINIC | Age: 65
End: 2025-04-15

## 2025-05-05 ENCOUNTER — APPOINTMENT (OUTPATIENT)
Dept: ORTHOPEDIC SURGERY | Facility: CLINIC | Age: 65
End: 2025-05-05
Payer: MEDICARE

## 2025-05-05 ENCOUNTER — NON-APPOINTMENT (OUTPATIENT)
Age: 65
End: 2025-05-05

## 2025-05-05 VITALS — HEIGHT: 70 IN | BODY MASS INDEX: 33.5 KG/M2 | WEIGHT: 234 LBS

## 2025-05-05 DIAGNOSIS — Z96.642 PRESENCE OF LEFT ARTIFICIAL HIP JOINT: ICD-10-CM

## 2025-05-05 DIAGNOSIS — M17.12 UNILATERAL PRIMARY OSTEOARTHRITIS, LEFT KNEE: ICD-10-CM

## 2025-05-05 DIAGNOSIS — M17.11 UNILATERAL PRIMARY OSTEOARTHRITIS, RIGHT KNEE: ICD-10-CM

## 2025-05-05 DIAGNOSIS — Z96.641 PRESENCE OF RIGHT ARTIFICIAL HIP JOINT: ICD-10-CM

## 2025-05-05 PROCEDURE — 99213 OFFICE O/P EST LOW 20 MIN: CPT

## 2025-05-05 PROCEDURE — 73564 X-RAY EXAM KNEE 4 OR MORE: CPT | Mod: 50

## 2025-05-05 PROCEDURE — 73522 X-RAY EXAM HIPS BI 3-4 VIEWS: CPT

## 2025-05-05 RX ORDER — MELOXICAM 15 MG/1
15 TABLET ORAL
Qty: 30 | Refills: 0 | Status: ACTIVE | COMMUNITY
Start: 2025-05-05 | End: 1900-01-01

## 2025-05-09 PROBLEM — M17.11 ARTHRITIS OF RIGHT KNEE: Status: ACTIVE | Noted: 2025-05-09

## 2025-05-09 PROBLEM — M17.12 ARTHRITIS OF LEFT KNEE: Status: ACTIVE | Noted: 2025-05-09

## 2025-06-25 ENCOUNTER — APPOINTMENT (OUTPATIENT)
Dept: ORTHOPEDIC SURGERY | Facility: CLINIC | Age: 65
End: 2025-06-25

## 2025-06-25 VITALS — BODY MASS INDEX: 32.93 KG/M2 | HEIGHT: 70 IN | WEIGHT: 230 LBS

## 2025-06-25 PROBLEM — M18.0 PRIMARY OSTEOARTHRITIS OF BOTH FIRST CARPOMETACARPAL JOINTS: Status: ACTIVE | Noted: 2025-06-25

## 2025-06-25 PROCEDURE — 20550 NJX 1 TENDON SHEATH/LIGAMENT: CPT

## 2025-06-25 PROCEDURE — 73140 X-RAY EXAM OF FINGER(S): CPT | Mod: 50

## 2025-06-25 PROCEDURE — 99214 OFFICE O/P EST MOD 30 MIN: CPT | Mod: 25

## 2025-07-22 ENCOUNTER — APPOINTMENT (OUTPATIENT)
Dept: PLASTIC SURGERY | Facility: CLINIC | Age: 65
End: 2025-07-22
Payer: MEDICARE

## 2025-07-22 PROCEDURE — 99213 OFFICE O/P EST LOW 20 MIN: CPT

## 2025-07-22 PROCEDURE — G2211 COMPLEX E/M VISIT ADD ON: CPT

## 2025-07-28 ENCOUNTER — APPOINTMENT (OUTPATIENT)
Dept: ORTHOPEDIC SURGERY | Facility: CLINIC | Age: 65
End: 2025-07-28
Payer: MEDICARE

## 2025-07-28 DIAGNOSIS — M65.311 TRIGGER THUMB, RIGHT THUMB: ICD-10-CM

## 2025-07-28 PROCEDURE — 20550 NJX 1 TENDON SHEATH/LIGAMENT: CPT

## 2025-07-28 PROCEDURE — 99202 OFFICE O/P NEW SF 15 MIN: CPT | Mod: 25

## 2025-08-18 ENCOUNTER — APPOINTMENT (OUTPATIENT)
Dept: SURGERY | Facility: CLINIC | Age: 65
End: 2025-08-18
Payer: MEDICARE

## 2025-08-18 VITALS
DIASTOLIC BLOOD PRESSURE: 72 MMHG | SYSTOLIC BLOOD PRESSURE: 112 MMHG | BODY MASS INDEX: 33.5 KG/M2 | HEART RATE: 75 BPM | HEIGHT: 70 IN | OXYGEN SATURATION: 97 % | TEMPERATURE: 97 F | WEIGHT: 234 LBS

## 2025-08-18 DIAGNOSIS — G47.33 OBSTRUCTIVE SLEEP APNEA (ADULT) (PEDIATRIC): ICD-10-CM

## 2025-08-18 PROCEDURE — 99213 OFFICE O/P EST LOW 20 MIN: CPT

## 2025-08-27 ENCOUNTER — RX RENEWAL (OUTPATIENT)
Age: 65
End: 2025-08-27

## 2025-09-14 ENCOUNTER — EMERGENCY (EMERGENCY)
Facility: HOSPITAL | Age: 65
LOS: 0 days | Discharge: HOME | End: 2025-09-14
Attending: EMERGENCY MEDICINE
Payer: MEDICARE

## 2025-09-14 ENCOUNTER — NON-APPOINTMENT (OUTPATIENT)
Age: 65
End: 2025-09-14

## 2025-09-14 VITALS
WEIGHT: 229.94 LBS | TEMPERATURE: 98 F | DIASTOLIC BLOOD PRESSURE: 85 MMHG | SYSTOLIC BLOOD PRESSURE: 142 MMHG | HEIGHT: 70 IN | RESPIRATION RATE: 18 BRPM | HEART RATE: 78 BPM | OXYGEN SATURATION: 98 %

## 2025-09-14 DIAGNOSIS — Z98.890 OTHER SPECIFIED POSTPROCEDURAL STATES: Chronic | ICD-10-CM

## 2025-09-14 DIAGNOSIS — M79.89 OTHER SPECIFIED SOFT TISSUE DISORDERS: ICD-10-CM

## 2025-09-14 DIAGNOSIS — C48.0 MALIGNANT NEOPLASM OF RETROPERITONEUM: Chronic | ICD-10-CM

## 2025-09-14 DIAGNOSIS — Z96.642 PRESENCE OF LEFT ARTIFICIAL HIP JOINT: Chronic | ICD-10-CM

## 2025-09-14 DIAGNOSIS — Z92.89 PERSONAL HISTORY OF OTHER MEDICAL TREATMENT: Chronic | ICD-10-CM

## 2025-09-14 DIAGNOSIS — Z96.641 PRESENCE OF RIGHT ARTIFICIAL HIP JOINT: Chronic | ICD-10-CM

## 2025-09-14 DIAGNOSIS — L03.116 CELLULITIS OF LEFT LOWER LIMB: ICD-10-CM

## 2025-09-14 PROCEDURE — 93970 EXTREMITY STUDY: CPT | Mod: 26

## 2025-09-14 PROCEDURE — 99284 EMERGENCY DEPT VISIT MOD MDM: CPT

## 2025-09-14 PROCEDURE — 93970 EXTREMITY STUDY: CPT

## 2025-09-14 PROCEDURE — 99284 EMERGENCY DEPT VISIT MOD MDM: CPT | Mod: 25

## 2025-09-14 RX ORDER — CEPHALEXIN 250 MG/1
500 CAPSULE ORAL ONCE
Refills: 0 | Status: COMPLETED | OUTPATIENT
Start: 2025-09-14 | End: 2025-09-14

## 2025-09-14 RX ORDER — CEFADROXIL 500 MG/1
1 CAPSULE ORAL
Qty: 20 | Refills: 0
Start: 2025-09-14 | End: 2025-09-23

## 2025-09-14 RX ADMIN — CEPHALEXIN 500 MILLIGRAM(S): 250 CAPSULE ORAL at 11:53

## 2025-09-19 ENCOUNTER — NON-APPOINTMENT (OUTPATIENT)
Age: 65
End: 2025-09-19